# Patient Record
Sex: MALE | Race: BLACK OR AFRICAN AMERICAN | NOT HISPANIC OR LATINO | URBAN - METROPOLITAN AREA
[De-identification: names, ages, dates, MRNs, and addresses within clinical notes are randomized per-mention and may not be internally consistent; named-entity substitution may affect disease eponyms.]

---

## 2018-02-24 ENCOUNTER — OUTPATIENT (OUTPATIENT)
Dept: OUTPATIENT SERVICES | Facility: HOSPITAL | Age: 68
LOS: 1 days | Discharge: HOME | End: 2018-02-24

## 2018-02-24 DIAGNOSIS — M54.12 RADICULOPATHY, CERVICAL REGION: ICD-10-CM

## 2018-02-24 DIAGNOSIS — C61 MALIGNANT NEOPLASM OF PROSTATE: ICD-10-CM

## 2018-02-24 DIAGNOSIS — I10 ESSENTIAL (PRIMARY) HYPERTENSION: ICD-10-CM

## 2018-02-24 DIAGNOSIS — E66.9 OBESITY, UNSPECIFIED: ICD-10-CM

## 2018-04-07 ENCOUNTER — OUTPATIENT (OUTPATIENT)
Dept: OUTPATIENT SERVICES | Facility: HOSPITAL | Age: 68
LOS: 1 days | Discharge: HOME | End: 2018-04-07

## 2018-04-07 DIAGNOSIS — E66.9 OBESITY, UNSPECIFIED: ICD-10-CM

## 2018-04-07 DIAGNOSIS — E55.9 VITAMIN D DEFICIENCY, UNSPECIFIED: ICD-10-CM

## 2018-04-07 DIAGNOSIS — E78.5 HYPERLIPIDEMIA, UNSPECIFIED: ICD-10-CM

## 2018-04-07 DIAGNOSIS — I10 ESSENTIAL (PRIMARY) HYPERTENSION: ICD-10-CM

## 2018-04-07 PROBLEM — Z00.00 ENCOUNTER FOR PREVENTIVE HEALTH EXAMINATION: Status: ACTIVE | Noted: 2018-04-07

## 2018-05-11 ENCOUNTER — APPOINTMENT (OUTPATIENT)
Dept: UROLOGY | Facility: CLINIC | Age: 68
End: 2018-05-11
Payer: MEDICARE

## 2018-05-11 VITALS
HEIGHT: 70 IN | WEIGHT: 215 LBS | HEART RATE: 52 BPM | SYSTOLIC BLOOD PRESSURE: 134 MMHG | BODY MASS INDEX: 30.78 KG/M2 | DIASTOLIC BLOOD PRESSURE: 81 MMHG

## 2018-05-11 DIAGNOSIS — Z82.0 FAMILY HISTORY OF EPILEPSY AND OTHER DISEASES OF THE NERVOUS SYSTEM: ICD-10-CM

## 2018-05-11 DIAGNOSIS — Z82.49 FAMILY HISTORY OF ISCHEMIC HEART DISEASE AND OTHER DISEASES OF THE CIRCULATORY SYSTEM: ICD-10-CM

## 2018-05-11 DIAGNOSIS — Z78.9 OTHER SPECIFIED HEALTH STATUS: ICD-10-CM

## 2018-05-11 DIAGNOSIS — K59.09 OTHER CONSTIPATION: ICD-10-CM

## 2018-05-11 DIAGNOSIS — Z83.3 FAMILY HISTORY OF DIABETES MELLITUS: ICD-10-CM

## 2018-05-11 PROCEDURE — 99203 OFFICE O/P NEW LOW 30 MIN: CPT

## 2018-05-11 RX ORDER — BENZONATATE 200 MG/1
200 CAPSULE ORAL
Qty: 20 | Refills: 0 | Status: COMPLETED | COMMUNITY
Start: 2018-02-15 | End: 2018-05-11

## 2018-05-11 RX ORDER — RANITIDINE 150 MG/1
150 TABLET ORAL
Qty: 60 | Refills: 0 | Status: COMPLETED | COMMUNITY
Start: 2018-04-03 | End: 2018-05-11

## 2018-05-11 RX ORDER — PSYLLIUM SEED
PACKET (EA) ORAL
Refills: 0 | Status: ACTIVE | COMMUNITY

## 2018-05-11 RX ORDER — POLYETHYLENE GLYCOL-3350 AND ELECTROLYTES 236; 6.74; 5.86; 2.97; 22.74 G/274.31G; G/274.31G; G/274.31G; G/274.31G; G/274.31G
236 POWDER, FOR SOLUTION ORAL
Qty: 4000 | Refills: 0 | Status: COMPLETED | COMMUNITY
Start: 2018-04-03

## 2018-05-11 RX ORDER — DOCUSATE SODIUM 100 MG/1
100 CAPSULE, LIQUID FILLED ORAL
Qty: 90 | Refills: 0 | Status: COMPLETED | COMMUNITY
Start: 2018-01-03

## 2018-05-11 RX ORDER — LEVOFLOXACIN 500 MG/1
500 TABLET, FILM COATED ORAL
Qty: 5 | Refills: 0 | Status: COMPLETED | COMMUNITY
Start: 2017-12-18

## 2018-05-11 RX ORDER — CASTOR OIL
OIL (ML) ORAL
Qty: 300 | Refills: 0 | Status: COMPLETED | COMMUNITY
Start: 2018-01-03

## 2018-05-11 RX ORDER — NIACIN 1000 MG/1
TABLET, FILM COATED, EXTENDED RELEASE ORAL
Refills: 0 | Status: ACTIVE | COMMUNITY

## 2018-05-11 RX ORDER — OSELTAMIVIR PHOSPHATE 75 MG/1
75 CAPSULE ORAL
Qty: 10 | Refills: 0 | Status: COMPLETED | COMMUNITY
Start: 2018-02-15

## 2018-05-11 NOTE — REVIEW OF SYSTEMS
[Eyesight Problems] : eyesight problems [Constipation] : constipation [Incontinence] : incontinence [see HPI] : see HPI [Erectile Dysfunction] : erectile dysfunction [Negative] : Heme/Lymph

## 2018-05-11 NOTE — PHYSICAL EXAM
[General Appearance - Well Developed] : well developed [General Appearance - Well Nourished] : well nourished [Normal Appearance] : normal appearance [Well Groomed] : well groomed [General Appearance - In No Acute Distress] : no acute distress [Heart Rate And Rhythm] : Heart rate and rhythm were normal [Edema] : no peripheral edema [] : no respiratory distress [Respiration, Rhythm And Depth] : normal respiratory rhythm and effort [Exaggerated Use Of Accessory Muscles For Inspiration] : no accessory muscle use [Auscultation Breath Sounds / Voice Sounds] : lungs were clear to auscultation bilaterally [Abdomen Soft] : soft [Abdomen Tenderness] : non-tender [Abdomen Hernia] : no hernia was discovered [Costovertebral Angle Tenderness] : no ~M costovertebral angle tenderness [Penis Abnormality] : normal uncircumcised penis [Epididymis] : the epididymides were normal [Testes Tenderness] : no tenderness of the testes [Testes Mass (___cm)] : there were no testicular masses [Normal Station and Gait] : the gait and station were normal for the patient's age [No Focal Deficits] : no focal deficits [Oriented To Time, Place, And Person] : oriented to person, place, and time [Affect] : the affect was normal [Mood] : the mood was normal [Not Anxious] : not anxious [FreeTextEntry1] : DTR's & BC reflexes were intact

## 2018-05-11 NOTE — HISTORY OF PRESENT ILLNESS
[Urinary Incontinence] : urinary incontinence [Erectile Dysfunction] : Erectile Dysfunction [FreeTextEntry1] : Mr. Mcintosh is a very pleasant 59-year-old male who one year ago had a radical prostatectomy done open in [place]. Since his operation he said total incontinence. The time is bladder wholesome urine is when he is lying down and this soon as he stands up it justly straight out. He also has no erections. He’s been tried on PDE five inhibitors and it didn’t work. He has not been tried on self injection, and he is not try to Cunningham clamp. He sent here consideration for what we can do to help them both with respect to his incontinence and his impotence.\par \par He denies any comorbidities hypertension diabetes etc. has a long-term partner and if we can fix his erections he feels she would be very happy.\par

## 2018-05-11 NOTE — LETTER BODY
[Dear  ___] : Dear  [unfilled], [Consult Letter:] : I had the pleasure of evaluating your patient, [unfilled]. [Please see my note below.] : Please see my note below. [Consult Closing:] : Thank you very much for allowing me to participate in the care of this patient.  If you have any questions, please do not hesitate to contact me. [Sincerely,] : Sincerely, [FreeTextEntry2] : Juan Osborn DO\par [address] [place] [address]\par [place] [phone]

## 2018-05-11 NOTE — LETTER HEADER
[Radha Younger MD] : Radha Younger MD [Director of Urology] : Director of Urology [[address]] : [address] [[address]] : [address] [[place] [address]] : [place] [address] [Tel [phone]] : Tel [phone] [Fax [phone]] : Fax [phone]

## 2018-05-11 NOTE — ASSESSMENT
[FreeTextEntry1] : His exam shows him to be in fair shape and spirits. He has mild tethering of the dorsal surface of the Tunica but not enough to be considered Peyronie’s. The fossa is flat. Lying down and with strain he doesn’t leak. However as soon as he stands up and gives even the smallest cough the urine just pours out. LAURA shows a flat fossa.\par \par With respect to his incontinence will need urodynamics to see if he’s a candidate for a sling or if he needs a sphincter but I don’t think anything else will help him. In the interim if he wants to not walk around smelling from urine in a diaper he can try a Cunningham clamp and he will give him some information on that.\par \par With respect to his erections he can consider self injection or he can consider a dual sphincter penile prosthesis which could be done at the same time.\par \par What I will do is set him up for the urodynamics, in the interim either before or after especially given the degree of his incontinence he may not need the urodynamics, I’ll have him see our prosthetic specialist Dr. Castillo. He will also get a PSA if he does not have one recent as well as hormone levels. Finally I will need his Byars criteria classification are going to treat his erections and obviously he will need medical clearance if he’s going to surgery.\par

## 2018-05-12 ENCOUNTER — LABORATORY RESULT (OUTPATIENT)
Age: 68
End: 2018-05-12

## 2018-05-12 ENCOUNTER — OUTPATIENT (OUTPATIENT)
Dept: OUTPATIENT SERVICES | Facility: HOSPITAL | Age: 68
LOS: 1 days | Discharge: HOME | End: 2018-05-12

## 2018-05-12 DIAGNOSIS — E29.1 TESTICULAR HYPOFUNCTION: ICD-10-CM

## 2018-05-29 ENCOUNTER — APPOINTMENT (OUTPATIENT)
Dept: UROLOGY | Facility: CLINIC | Age: 68
End: 2018-05-29
Payer: MEDICARE

## 2018-05-29 VITALS
BODY MASS INDEX: 30.78 KG/M2 | HEIGHT: 70 IN | HEART RATE: 66 BPM | WEIGHT: 215 LBS | DIASTOLIC BLOOD PRESSURE: 91 MMHG | SYSTOLIC BLOOD PRESSURE: 142 MMHG

## 2018-05-29 PROCEDURE — 99215 OFFICE O/P EST HI 40 MIN: CPT

## 2018-06-18 ENCOUNTER — APPOINTMENT (OUTPATIENT)
Dept: UROLOGY | Facility: CLINIC | Age: 68
End: 2018-06-18
Payer: MEDICARE

## 2018-06-18 VITALS
DIASTOLIC BLOOD PRESSURE: 82 MMHG | HEIGHT: 70.5 IN | BODY MASS INDEX: 36.81 KG/M2 | WEIGHT: 260 LBS | HEART RATE: 70 BPM | SYSTOLIC BLOOD PRESSURE: 131 MMHG

## 2018-06-18 PROCEDURE — 99213 OFFICE O/P EST LOW 20 MIN: CPT | Mod: 25

## 2018-06-18 PROCEDURE — 52000 CYSTOURETHROSCOPY: CPT

## 2018-07-02 ENCOUNTER — APPOINTMENT (OUTPATIENT)
Dept: UROLOGY | Facility: CLINIC | Age: 68
End: 2018-07-02

## 2018-08-01 ENCOUNTER — OUTPATIENT (OUTPATIENT)
Dept: OUTPATIENT SERVICES | Facility: HOSPITAL | Age: 68
LOS: 1 days | Discharge: HOME | End: 2018-08-01

## 2018-08-01 VITALS
TEMPERATURE: 97 F | HEIGHT: 71 IN | SYSTOLIC BLOOD PRESSURE: 133 MMHG | DIASTOLIC BLOOD PRESSURE: 86 MMHG | HEART RATE: 65 BPM | OXYGEN SATURATION: 100 % | WEIGHT: 213.63 LBS

## 2018-08-01 DIAGNOSIS — Z98.890 OTHER SPECIFIED POSTPROCEDURAL STATES: Chronic | ICD-10-CM

## 2018-08-01 DIAGNOSIS — N32.0 BLADDER-NECK OBSTRUCTION: ICD-10-CM

## 2018-08-01 DIAGNOSIS — C80.1 MALIGNANT (PRIMARY) NEOPLASM, UNSPECIFIED: ICD-10-CM

## 2018-08-01 DIAGNOSIS — Z01.818 ENCOUNTER FOR OTHER PREPROCEDURAL EXAMINATION: ICD-10-CM

## 2018-08-01 DIAGNOSIS — R06.02 SHORTNESS OF BREATH: ICD-10-CM

## 2018-08-01 DIAGNOSIS — R07.9 CHEST PAIN, UNSPECIFIED: ICD-10-CM

## 2018-08-01 DIAGNOSIS — G47.30 SLEEP APNEA, UNSPECIFIED: ICD-10-CM

## 2018-08-01 LAB
ALBUMIN SERPL ELPH-MCNC: 4.1 G/DL — SIGNIFICANT CHANGE UP (ref 3.5–5.2)
ALP SERPL-CCNC: 56 U/L — SIGNIFICANT CHANGE UP (ref 30–115)
ALT FLD-CCNC: 12 U/L — SIGNIFICANT CHANGE UP (ref 0–41)
ANION GAP SERPL CALC-SCNC: 10 MMOL/L — SIGNIFICANT CHANGE UP (ref 7–14)
APPEARANCE UR: CLEAR — SIGNIFICANT CHANGE UP
APTT BLD: 35.1 SEC — SIGNIFICANT CHANGE UP (ref 27–39.2)
AST SERPL-CCNC: 18 U/L — SIGNIFICANT CHANGE UP (ref 0–41)
BILIRUB SERPL-MCNC: 0.3 MG/DL — SIGNIFICANT CHANGE UP (ref 0.2–1.2)
BILIRUB UR-MCNC: NEGATIVE — SIGNIFICANT CHANGE UP
BLD GP AB SCN SERPL QL: SIGNIFICANT CHANGE UP
BUN SERPL-MCNC: 13 MG/DL — SIGNIFICANT CHANGE UP (ref 10–20)
CALCIUM SERPL-MCNC: 9.2 MG/DL — SIGNIFICANT CHANGE UP (ref 8.5–10.1)
CHLORIDE SERPL-SCNC: 102 MMOL/L — SIGNIFICANT CHANGE UP (ref 98–110)
CO2 SERPL-SCNC: 27 MMOL/L — SIGNIFICANT CHANGE UP (ref 17–32)
COLOR SPEC: YELLOW — SIGNIFICANT CHANGE UP
CREAT SERPL-MCNC: 1 MG/DL — SIGNIFICANT CHANGE UP (ref 0.7–1.5)
DIFF PNL FLD: NEGATIVE — SIGNIFICANT CHANGE UP
GLUCOSE SERPL-MCNC: 75 MG/DL — SIGNIFICANT CHANGE UP (ref 70–99)
GLUCOSE UR QL: NEGATIVE MG/DL — SIGNIFICANT CHANGE UP
HCT VFR BLD CALC: 39.2 % — LOW (ref 42–52)
HGB BLD-MCNC: 13.4 G/DL — LOW (ref 14–18)
INR BLD: 1.15 RATIO — SIGNIFICANT CHANGE UP (ref 0.65–1.3)
KETONES UR-MCNC: NEGATIVE — SIGNIFICANT CHANGE UP
LEUKOCYTE ESTERASE UR-ACNC: NEGATIVE — SIGNIFICANT CHANGE UP
MCHC RBC-ENTMCNC: 31.8 PG — HIGH (ref 27–31)
MCHC RBC-ENTMCNC: 34.2 G/DL — SIGNIFICANT CHANGE UP (ref 32–37)
MCV RBC AUTO: 93.1 FL — SIGNIFICANT CHANGE UP (ref 80–94)
NITRITE UR-MCNC: NEGATIVE — SIGNIFICANT CHANGE UP
NRBC # BLD: 0 /100 WBCS — SIGNIFICANT CHANGE UP (ref 0–0)
PH UR: 7.5 — SIGNIFICANT CHANGE UP (ref 5–8)
PLATELET # BLD AUTO: 189 K/UL — SIGNIFICANT CHANGE UP (ref 130–400)
POTASSIUM SERPL-MCNC: 4.3 MMOL/L — SIGNIFICANT CHANGE UP (ref 3.5–5)
POTASSIUM SERPL-SCNC: 4.3 MMOL/L — SIGNIFICANT CHANGE UP (ref 3.5–5)
PROT SERPL-MCNC: 6.6 G/DL — SIGNIFICANT CHANGE UP (ref 6–8)
PROT UR-MCNC: NEGATIVE MG/DL — SIGNIFICANT CHANGE UP
PROTHROM AB SERPL-ACNC: 12.4 SEC — SIGNIFICANT CHANGE UP (ref 9.95–12.87)
RBC # BLD: 4.21 M/UL — LOW (ref 4.7–6.1)
RBC # FLD: 13.1 % — SIGNIFICANT CHANGE UP (ref 11.5–14.5)
SODIUM SERPL-SCNC: 139 MMOL/L — SIGNIFICANT CHANGE UP (ref 135–146)
SP GR SPEC: 1.02 — SIGNIFICANT CHANGE UP (ref 1.01–1.03)
TYPE + AB SCN PNL BLD: SIGNIFICANT CHANGE UP
UROBILINOGEN FLD QL: 0.2 MG/DL — SIGNIFICANT CHANGE UP (ref 0.2–0.2)
WBC # BLD: 4.6 K/UL — LOW (ref 4.8–10.8)
WBC # FLD AUTO: 4.6 K/UL — LOW (ref 4.8–10.8)

## 2018-08-01 NOTE — H&P PST ADULT - PMH
Migraines    Numbness  legs toes siatica  Obesity    Prostate cancer    Seasonal allergies    Sleep apnea  no c-pap insurance issues  SOB (shortness of breath)

## 2018-08-01 NOTE — H&P PST ADULT - HISTORY OF PRESENT ILLNESS
67 year old male having issues with leaking urine that is getting" worse and worse". here today for past testing for upcoming surgery to "fix that"  fos= 1 after 1 +sob + tightness in chest after climbing tests +palp  denies uri or uti  PT DENEIS ANY RASHES, ABRASION, OR OPEN WOUNDS OR CUTS  Get feeling of skin "crawling ,happens pretty often"

## 2018-08-02 PROBLEM — G47.30 SLEEP APNEA, UNSPECIFIED: Chronic | Status: ACTIVE | Noted: 2018-08-01

## 2018-08-02 PROBLEM — R20.0 ANESTHESIA OF SKIN: Chronic | Status: ACTIVE | Noted: 2018-08-01

## 2018-08-02 LAB
CULTURE RESULTS: NO GROWTH — SIGNIFICANT CHANGE UP
SPECIMEN SOURCE: SIGNIFICANT CHANGE UP

## 2018-08-15 PROBLEM — J30.2 OTHER SEASONAL ALLERGIC RHINITIS: Chronic | Status: ACTIVE | Noted: 2018-08-01

## 2018-08-15 PROBLEM — R06.02 SHORTNESS OF BREATH: Chronic | Status: ACTIVE | Noted: 2018-08-01

## 2018-08-15 PROBLEM — E66.9 OBESITY, UNSPECIFIED: Chronic | Status: ACTIVE | Noted: 2018-08-01

## 2018-08-15 PROBLEM — G43.909 MIGRAINE, UNSPECIFIED, NOT INTRACTABLE, WITHOUT STATUS MIGRAINOSUS: Chronic | Status: ACTIVE | Noted: 2018-08-01

## 2018-08-15 PROBLEM — C61 MALIGNANT NEOPLASM OF PROSTATE: Chronic | Status: ACTIVE | Noted: 2018-08-01

## 2018-08-24 ENCOUNTER — OUTPATIENT (OUTPATIENT)
Dept: OUTPATIENT SERVICES | Facility: HOSPITAL | Age: 68
LOS: 1 days | Discharge: HOME | End: 2018-08-24

## 2018-08-24 DIAGNOSIS — Z98.890 OTHER SPECIFIED POSTPROCEDURAL STATES: Chronic | ICD-10-CM

## 2018-08-27 ENCOUNTER — OUTPATIENT (OUTPATIENT)
Dept: OUTPATIENT SERVICES | Facility: HOSPITAL | Age: 68
LOS: 1 days | Discharge: HOME | End: 2018-08-27

## 2018-08-27 DIAGNOSIS — I20.9 ANGINA PECTORIS, UNSPECIFIED: ICD-10-CM

## 2018-08-27 DIAGNOSIS — Z98.890 OTHER SPECIFIED POSTPROCEDURAL STATES: Chronic | ICD-10-CM

## 2018-08-29 ENCOUNTER — OUTPATIENT (OUTPATIENT)
Dept: OUTPATIENT SERVICES | Facility: HOSPITAL | Age: 68
LOS: 1 days | Discharge: HOME | End: 2018-08-29

## 2018-08-29 ENCOUNTER — APPOINTMENT (OUTPATIENT)
Dept: UROLOGY | Facility: HOSPITAL | Age: 68
End: 2018-08-29
Payer: MEDICARE

## 2018-08-29 ENCOUNTER — INPATIENT (INPATIENT)
Facility: HOSPITAL | Age: 68
LOS: 1 days | Discharge: HOME | End: 2018-08-31
Attending: UROLOGY | Admitting: UROLOGY

## 2018-08-29 VITALS
DIASTOLIC BLOOD PRESSURE: 79 MMHG | SYSTOLIC BLOOD PRESSURE: 145 MMHG | OXYGEN SATURATION: 98 % | HEART RATE: 79 BPM | RESPIRATION RATE: 18 BRPM

## 2018-08-29 VITALS
DIASTOLIC BLOOD PRESSURE: 80 MMHG | RESPIRATION RATE: 18 BRPM | HEART RATE: 76 BPM | OXYGEN SATURATION: 96 % | SYSTOLIC BLOOD PRESSURE: 126 MMHG

## 2018-08-29 VITALS
WEIGHT: 214.07 LBS | TEMPERATURE: 99 F | HEIGHT: 70 IN | DIASTOLIC BLOOD PRESSURE: 84 MMHG | HEART RATE: 57 BPM | RESPIRATION RATE: 18 BRPM | SYSTOLIC BLOOD PRESSURE: 129 MMHG

## 2018-08-29 DIAGNOSIS — Z98.890 OTHER SPECIFIED POSTPROCEDURAL STATES: Chronic | ICD-10-CM

## 2018-08-29 DIAGNOSIS — Z90.79 ACQUIRED ABSENCE OF OTHER GENITAL ORGAN(S): Chronic | ICD-10-CM

## 2018-08-29 PROCEDURE — 52640 RELIEVE BLADDER CONTRACTURE: CPT

## 2018-08-29 RX ORDER — METOCLOPRAMIDE HCL 10 MG
10 TABLET ORAL ONCE
Qty: 0 | Refills: 0 | Status: DISCONTINUED | OUTPATIENT
Start: 2018-08-29 | End: 2018-08-29

## 2018-08-29 RX ORDER — HYDROMORPHONE HYDROCHLORIDE 2 MG/ML
0.5 INJECTION INTRAMUSCULAR; INTRAVENOUS; SUBCUTANEOUS
Qty: 0 | Refills: 0 | Status: DISCONTINUED | OUTPATIENT
Start: 2018-08-29 | End: 2018-08-29

## 2018-08-29 RX ORDER — FUROSEMIDE 40 MG
20 TABLET ORAL ONCE
Qty: 0 | Refills: 0 | Status: COMPLETED | OUTPATIENT
Start: 2018-08-29 | End: 2018-08-29

## 2018-08-29 RX ORDER — SODIUM CHLORIDE 9 MG/ML
1000 INJECTION, SOLUTION INTRAVENOUS ONCE
Qty: 0 | Refills: 0 | Status: COMPLETED | OUTPATIENT
Start: 2018-08-29 | End: 2018-08-29

## 2018-08-29 RX ORDER — MITOMYCIN 5 MG/10ML
10 INJECTION, POWDER, LYOPHILIZED, FOR SOLUTION INTRAVENOUS ONCE
Qty: 0 | Refills: 0 | Status: COMPLETED | OUTPATIENT
Start: 2018-08-29 | End: 2018-08-29

## 2018-08-29 RX ORDER — KETOROLAC TROMETHAMINE 30 MG/ML
30 SYRINGE (ML) INJECTION ONCE
Qty: 0 | Refills: 0 | Status: DISCONTINUED | OUTPATIENT
Start: 2018-08-29 | End: 2018-08-29

## 2018-08-29 RX ORDER — SODIUM CHLORIDE 9 MG/ML
1000 INJECTION, SOLUTION INTRAVENOUS
Qty: 0 | Refills: 0 | Status: DISCONTINUED | OUTPATIENT
Start: 2018-08-29 | End: 2018-08-29

## 2018-08-29 RX ADMIN — SODIUM CHLORIDE 100 MILLILITER(S): 9 INJECTION, SOLUTION INTRAVENOUS at 09:00

## 2018-08-29 RX ADMIN — MITOMYCIN 10 MILLIGRAM(S): 5 INJECTION, POWDER, LYOPHILIZED, FOR SOLUTION INTRAVENOUS at 08:58

## 2018-08-29 RX ADMIN — Medication 104 MILLIGRAM(S): at 10:43

## 2018-08-29 NOTE — H&P ADULT - ASSESSMENT
67y old Male with bladder neck contracture s/p transurethral incision and mitomycin injection POD#0 with gross hematuria via Nowak catheter.    PLAN:  1.	NPO after midnight  2.	Monitor H/H. Transfuse prn.  3.	Irrigate Nowak with sterile H2O q2-3 hrs. Will change catheter to 3-way and start CBI tonight.  4.	Home medications  5.	DVT ppx  6.	Discussed with Dr. Thompson 67y old Male with bladder neck contracture s/p transurethral incision and mitomycin injection POD#0 with gross hematuria via Nowak catheter.    PLAN:  1.	NPO after midnight  2.	F/u labs  3.	Monitor H/H. Transfuse prn.  4.	Irrigate Nowak with sterile H2O q2-3 hrs. Will change catheter to 3-way and start CBI tonight.  5.	Home medications  6.	DVT ppx  7.	Discussed with Dr. Thompson

## 2018-08-29 NOTE — BRIEF OPERATIVE NOTE - PROCEDURE
<<-----Click on this checkbox to enter Procedure Transurethral incision for bladder neck contracture  08/29/2018    Active  MAUDE

## 2018-08-29 NOTE — ED PROVIDER NOTE - NS ED ROS FT
Constitutional:  no fevers, no chills, no malaise  Eyes:  No visual changes  ENMT: No neck pain or stiffness, no nasal congestion, no ear pain, no throat pain  Cardiac:  No chest pain, + hx of HTN  Respiratory:  No cough or sob  GI:  No nausea, vomiting, diarrhea or abdominal pain.  :  As per HPI  MS:  No back pain, no joint pain.  Neuro:  No headache, no dizziness, no change in mental status  Skin:  No skin rash

## 2018-08-29 NOTE — ED PROVIDER NOTE - PHYSICAL EXAMINATION
CONSTITUTIONAL: Well-developed; well-nourished; in no acute distress, nontoxic appearing  SKIN: skin exam is warm and dry,  HEAD: Normocephalic; atraumatic.  EYES: PERRL, 3 mm bilateral, no nystagmus, EOM intact; conjunctiva and sclera clear.  ENT: MMM, no nasal congestion  NECK: Supple; non tender.+ full passive ROM in all directions. No JVD  CARD: S1, S2 normal, no murmur  RESP: No wheezes, rales or rhonchi. Good air movement bilaterally  ABD: soft; non-distended; + mild suprapubic abd ttp, No Rebound, No guarding  : + solomon catheter in place with a 500 cc grossly bloody outpt with clot from the Solomon os  EXT: Normal ROM. No cyanosis or edema. Dp Pulses intact.   NEURO: awake, alert, following commands, oriented, grossly unremarkable. No Focal deficits. GCS 15.   PSYCH: Cooperative, appropriate.

## 2018-08-29 NOTE — CHART NOTE - NSCHARTNOTEFT_GEN_A_CORE
VERONICA    Called to evaluate pt for Nowak not draining well.    Pt doing well in NAD  no suprapubic pain of discomfort.  Onwak draining blood tinged urine  catheter irrigated with 5oocc of sterile water  Return now clear 2 small clots removed.  will observe for another hr if remain clear will d/c home if catheter clots off will admit for observation  d/w Dr. pace

## 2018-08-29 NOTE — H&P ADULT - PMH
Incontinence of urine    Migraines    Numbness  legs toes siatica  Obesity    Prostate cancer    Seasonal allergies    Sleep apnea  no c-pap insurance issues  SOB (shortness of breath)

## 2018-08-29 NOTE — BRIEF OPERATIVE NOTE - OPERATION/FINDINGS
bladder neck contracture  incised to 24 Setswana  injection of 10 mg of Mitomycin C into contracture

## 2018-08-29 NOTE — ED PROVIDER NOTE - MEDICAL DECISION MAKING DETAILS
Pt s/p surgery today for uretheral strictures. Had hematuria. that persisted in ED. Is being admitted to urology.

## 2018-08-29 NOTE — ED PROVIDER NOTE - OBJECTIVE STATEMENT
67 yr M, hx of HTN, bladder neck stricture,  s/p reection today by Dr. Thompson, with complaints of blockage of the Nowak from a blood clot. pt reports being d/juan from PACU about 4 hours ago and reports decreased urinary output that is grossly bloody. Now have lower abd discomfort. No f/c, no nausea, vomiting, no lightheadedness, LOC, no use of AC/AP.

## 2018-08-29 NOTE — H&P ADULT - PSH
H/O eye surgery  right eye cornea  H/O radical prostatectomy    History of prostate surgery  removed 2/14/17

## 2018-08-29 NOTE — CHART NOTE - NSCHARTNOTEFT_GEN_A_CORE
VERONICA    Asked to see pt for post-op hematuria.  Pt doing well, pt was irrigated by Dr. Thompson.  Nowak draining well blood tinged urine.  Increase po fluids  Observe for another hour.  if urine remains blood tinged may be d/c'd if bloody  recall for evaluation.

## 2018-08-29 NOTE — H&P ADULT - NSHPPHYSICALEXAM_GEN_ALL_CORE
PHYSICAL EXAM:   VS: T(F): 97.2, Max: 98.9 (08-29 @ 06:17)  HR: 70 (56 - 79)  BP: 106/62 (106/62 - 171/103)  RR: 18  SpO2: 96% (92% - 100%)    General: well-appearing, no acute distress  HEENT: no posterior pharyngeal erythema, PERRL, no cervical lymphadenopathy, no injected conjunctiva, mucous membranes moist  HEART: RRR, S1, S2, cap refill <2 seconds  LUNG: CTAB, no wheezing, ronchi, or crackles  ABDOMEN: soft, NT, nondistended, no palpable masses, no suprapubic tenderness  : uncirc Male, foreskin easily retracts, some blood at meatus around Nowak catheter but no d/c, 20F Nowak in place draining bloody urine  NEURO: A&O, speech clear  MUSCULOSKELETAL: moves all extremities x4 with 5/5 strength throughout, bilateral LE warm/well perfused, no edema    Irrigated Nowak with 2L sterile H2O. 50cc clot removed. Irrigated until light red. Pt tolerated well.

## 2018-08-29 NOTE — ED PROVIDER NOTE - PROGRESS NOTE DETAILS
Urology PA consulted. Will come to evaluate pt. Urology TEREZA Cruz evaluated pt and flushed solomon and reports that urine out still grossly bloody. She will reassess pt in about 1 hour to determine dispo. Nancy reevaluated pt and stated that gross hematuria still present. Pt to be admitted to Dr. Thompson

## 2018-08-29 NOTE — H&P ADULT - HISTORY OF PRESENT ILLNESS
67y old Male with bladder neck contracture s/p transurethral incision and mitomycin injection POD#0. Pt states in PACU today, his urine via Nowak was grossly bloody, requiring irrigation on three separate occasions. Pt was sent home in stable condition. He presented to ER tonight c/o worsening/persistent gross hematuria with associated suprapubic pain. Denies fever, chills, N/V, weakness, lethargy, SOB, chest pain.

## 2018-08-29 NOTE — H&P ADULT - ATTENDING COMMENTS
I have seen and evaluated the patient   there was no evidence of active bleeding on cbi  the CBI was clamped and it appeared still tinged but without active bleeding  Hgb decreased to 34    - will continue to monitor patient  - CBC in AM

## 2018-08-29 NOTE — ED ADULT TRIAGE NOTE - CHIEF COMPLAINT QUOTE
pt had sx for bladder neck stricture today, had solomon catheter placed, now having gross hematuria.

## 2018-08-30 LAB
ALBUMIN SERPL ELPH-MCNC: 4.1 G/DL — SIGNIFICANT CHANGE UP (ref 3.5–5.2)
ALP SERPL-CCNC: 46 U/L — SIGNIFICANT CHANGE UP (ref 30–115)
ALT FLD-CCNC: 11 U/L — SIGNIFICANT CHANGE UP (ref 0–41)
ANION GAP SERPL CALC-SCNC: 11 MMOL/L — SIGNIFICANT CHANGE UP (ref 7–14)
ANION GAP SERPL CALC-SCNC: 12 MMOL/L — SIGNIFICANT CHANGE UP (ref 7–14)
AST SERPL-CCNC: 16 U/L — SIGNIFICANT CHANGE UP (ref 0–41)
BASOPHILS # BLD AUTO: 0.01 K/UL — SIGNIFICANT CHANGE UP (ref 0–0.2)
BASOPHILS # BLD AUTO: 0.02 K/UL — SIGNIFICANT CHANGE UP (ref 0–0.2)
BASOPHILS NFR BLD AUTO: 0.1 % — SIGNIFICANT CHANGE UP (ref 0–1)
BASOPHILS NFR BLD AUTO: 0.2 % — SIGNIFICANT CHANGE UP (ref 0–1)
BILIRUB SERPL-MCNC: 0.4 MG/DL — SIGNIFICANT CHANGE UP (ref 0.2–1.2)
BLD GP AB SCN SERPL QL: SIGNIFICANT CHANGE UP
BUN SERPL-MCNC: 17 MG/DL — SIGNIFICANT CHANGE UP (ref 10–20)
BUN SERPL-MCNC: 21 MG/DL — HIGH (ref 10–20)
CALCIUM SERPL-MCNC: 8.7 MG/DL — SIGNIFICANT CHANGE UP (ref 8.5–10.1)
CALCIUM SERPL-MCNC: 9.3 MG/DL — SIGNIFICANT CHANGE UP (ref 8.5–10.1)
CHLORIDE SERPL-SCNC: 102 MMOL/L — SIGNIFICANT CHANGE UP (ref 98–110)
CHLORIDE SERPL-SCNC: 97 MMOL/L — LOW (ref 98–110)
CO2 SERPL-SCNC: 26 MMOL/L — SIGNIFICANT CHANGE UP (ref 17–32)
CO2 SERPL-SCNC: 27 MMOL/L — SIGNIFICANT CHANGE UP (ref 17–32)
CREAT SERPL-MCNC: 1.1 MG/DL — SIGNIFICANT CHANGE UP (ref 0.7–1.5)
CREAT SERPL-MCNC: 1.3 MG/DL — SIGNIFICANT CHANGE UP (ref 0.7–1.5)
EOSINOPHIL # BLD AUTO: 0.01 K/UL — SIGNIFICANT CHANGE UP (ref 0–0.7)
EOSINOPHIL # BLD AUTO: 0.07 K/UL — SIGNIFICANT CHANGE UP (ref 0–0.7)
EOSINOPHIL NFR BLD AUTO: 0.1 % — SIGNIFICANT CHANGE UP (ref 0–8)
EOSINOPHIL NFR BLD AUTO: 0.8 % — SIGNIFICANT CHANGE UP (ref 0–8)
GLUCOSE SERPL-MCNC: 112 MG/DL — HIGH (ref 70–99)
GLUCOSE SERPL-MCNC: 82 MG/DL — SIGNIFICANT CHANGE UP (ref 70–99)
HCT VFR BLD CALC: 34.7 % — LOW (ref 42–52)
HCT VFR BLD CALC: 38.1 % — LOW (ref 42–52)
HGB BLD-MCNC: 12.1 G/DL — LOW (ref 14–18)
HGB BLD-MCNC: 13.2 G/DL — LOW (ref 14–18)
IMM GRANULOCYTES NFR BLD AUTO: 0.2 % — SIGNIFICANT CHANGE UP (ref 0.1–0.3)
IMM GRANULOCYTES NFR BLD AUTO: 0.4 % — HIGH (ref 0.1–0.3)
LYMPHOCYTES # BLD AUTO: 1.08 K/UL — LOW (ref 1.2–3.4)
LYMPHOCYTES # BLD AUTO: 1.56 K/UL — SIGNIFICANT CHANGE UP (ref 1.2–3.4)
LYMPHOCYTES # BLD AUTO: 18.6 % — LOW (ref 20.5–51.1)
LYMPHOCYTES # BLD AUTO: 9.7 % — LOW (ref 20.5–51.1)
MCHC RBC-ENTMCNC: 31.9 PG — HIGH (ref 27–31)
MCHC RBC-ENTMCNC: 32 PG — HIGH (ref 27–31)
MCHC RBC-ENTMCNC: 34.6 G/DL — SIGNIFICANT CHANGE UP (ref 32–37)
MCHC RBC-ENTMCNC: 34.9 G/DL — SIGNIFICANT CHANGE UP (ref 32–37)
MCV RBC AUTO: 91.8 FL — SIGNIFICANT CHANGE UP (ref 80–94)
MCV RBC AUTO: 92 FL — SIGNIFICANT CHANGE UP (ref 80–94)
MONOCYTES # BLD AUTO: 0.72 K/UL — HIGH (ref 0.1–0.6)
MONOCYTES # BLD AUTO: 0.77 K/UL — HIGH (ref 0.1–0.6)
MONOCYTES NFR BLD AUTO: 6.4 % — SIGNIFICANT CHANGE UP (ref 1.7–9.3)
MONOCYTES NFR BLD AUTO: 9.2 % — SIGNIFICANT CHANGE UP (ref 1.7–9.3)
NEUTROPHILS # BLD AUTO: 5.95 K/UL — SIGNIFICANT CHANGE UP (ref 1.4–6.5)
NEUTROPHILS # BLD AUTO: 9.32 K/UL — HIGH (ref 1.4–6.5)
NEUTROPHILS NFR BLD AUTO: 71 % — SIGNIFICANT CHANGE UP (ref 42.2–75.2)
NEUTROPHILS NFR BLD AUTO: 83.3 % — HIGH (ref 42.2–75.2)
NRBC # BLD: 0 /100 WBCS — SIGNIFICANT CHANGE UP (ref 0–0)
NRBC # BLD: 0 /100 WBCS — SIGNIFICANT CHANGE UP (ref 0–0)
PLATELET # BLD AUTO: 166 K/UL — SIGNIFICANT CHANGE UP (ref 130–400)
PLATELET # BLD AUTO: 188 K/UL — SIGNIFICANT CHANGE UP (ref 130–400)
POTASSIUM SERPL-MCNC: 3.8 MMOL/L — SIGNIFICANT CHANGE UP (ref 3.5–5)
POTASSIUM SERPL-MCNC: 4.5 MMOL/L — SIGNIFICANT CHANGE UP (ref 3.5–5)
POTASSIUM SERPL-SCNC: 3.8 MMOL/L — SIGNIFICANT CHANGE UP (ref 3.5–5)
POTASSIUM SERPL-SCNC: 4.5 MMOL/L — SIGNIFICANT CHANGE UP (ref 3.5–5)
PROT SERPL-MCNC: 6.5 G/DL — SIGNIFICANT CHANGE UP (ref 6–8)
RBC # BLD: 3.78 M/UL — LOW (ref 4.7–6.1)
RBC # BLD: 4.14 M/UL — LOW (ref 4.7–6.1)
RBC # FLD: 12.9 % — SIGNIFICANT CHANGE UP (ref 11.5–14.5)
RBC # FLD: 13 % — SIGNIFICANT CHANGE UP (ref 11.5–14.5)
SODIUM SERPL-SCNC: 136 MMOL/L — SIGNIFICANT CHANGE UP (ref 135–146)
SODIUM SERPL-SCNC: 139 MMOL/L — SIGNIFICANT CHANGE UP (ref 135–146)
TYPE + AB SCN PNL BLD: SIGNIFICANT CHANGE UP
WBC # BLD: 11.19 K/UL — HIGH (ref 4.8–10.8)
WBC # BLD: 8.39 K/UL — SIGNIFICANT CHANGE UP (ref 4.8–10.8)
WBC # FLD AUTO: 11.19 K/UL — HIGH (ref 4.8–10.8)
WBC # FLD AUTO: 8.39 K/UL — SIGNIFICANT CHANGE UP (ref 4.8–10.8)

## 2018-08-30 RX ORDER — CHLORHEXIDINE GLUCONATE 213 G/1000ML
1 SOLUTION TOPICAL
Qty: 0 | Refills: 0 | Status: DISCONTINUED | OUTPATIENT
Start: 2018-08-30 | End: 2018-08-31

## 2018-08-30 RX ORDER — SODIUM CHLORIDE 9 MG/ML
1000 INJECTION INTRAMUSCULAR; INTRAVENOUS; SUBCUTANEOUS
Qty: 0 | Refills: 0 | Status: DISCONTINUED | OUTPATIENT
Start: 2018-08-30 | End: 2018-08-31

## 2018-08-30 RX ORDER — SODIUM CHLORIDE 9 MG/ML
3 INJECTION INTRAMUSCULAR; INTRAVENOUS; SUBCUTANEOUS EVERY 8 HOURS
Qty: 0 | Refills: 0 | Status: DISCONTINUED | OUTPATIENT
Start: 2018-08-30 | End: 2018-08-31

## 2018-08-30 RX ORDER — ACETAMINOPHEN 500 MG
650 TABLET ORAL EVERY 6 HOURS
Qty: 0 | Refills: 0 | Status: DISCONTINUED | OUTPATIENT
Start: 2018-08-30 | End: 2018-08-31

## 2018-08-30 RX ORDER — ONDANSETRON 8 MG/1
4 TABLET, FILM COATED ORAL EVERY 6 HOURS
Qty: 0 | Refills: 0 | Status: DISCONTINUED | OUTPATIENT
Start: 2018-08-30 | End: 2018-08-31

## 2018-08-30 RX ORDER — LOSARTAN POTASSIUM 100 MG/1
25 TABLET, FILM COATED ORAL DAILY
Qty: 0 | Refills: 0 | Status: DISCONTINUED | OUTPATIENT
Start: 2018-08-30 | End: 2018-08-31

## 2018-08-30 RX ADMIN — SODIUM CHLORIDE 3 MILLILITER(S): 9 INJECTION INTRAMUSCULAR; INTRAVENOUS; SUBCUTANEOUS at 10:41

## 2018-08-30 RX ADMIN — SODIUM CHLORIDE 75 MILLILITER(S): 9 INJECTION INTRAMUSCULAR; INTRAVENOUS; SUBCUTANEOUS at 05:43

## 2018-08-30 RX ADMIN — LOSARTAN POTASSIUM 25 MILLIGRAM(S): 100 TABLET, FILM COATED ORAL at 06:54

## 2018-08-30 RX ADMIN — SODIUM CHLORIDE 3 MILLILITER(S): 9 INJECTION INTRAMUSCULAR; INTRAVENOUS; SUBCUTANEOUS at 05:45

## 2018-08-30 RX ADMIN — SODIUM CHLORIDE 1000 MILLILITER(S): 9 INJECTION, SOLUTION INTRAVENOUS at 00:04

## 2018-08-30 RX ADMIN — SODIUM CHLORIDE 3 MILLILITER(S): 9 INJECTION INTRAMUSCULAR; INTRAVENOUS; SUBCUTANEOUS at 21:20

## 2018-08-30 NOTE — PROGRESS NOTE ADULT - SUBJECTIVE AND OBJECTIVE BOX
Progress Note    Subjective  66 y/o Male with hx of bladder neck contracture s/p TUIBN.  Pt returned to the ED last pm for clot retention. He was started on CBI with  traction. CBI at a slow rate urine is clear to slightly tinged.  Pt with no complaints.      Vital signs  T(C): , Max: 36.9 (08-29-18 @ 15:00)  HR: 69 (08-30-18 @ 04:00)  BP: 137/75 (08-30-18 @ 04:00)  SpO2: 96% (08-29-18 @ 23:22)    Gen in NAD  Abd Soft non tender, bladder is not palpable   CBI slow drip clear to slightly tinged output    Labs                        13.2   11.19 )-----------( 188      ( 29 Aug 2018 23:18 )             38.1     29 Aug 2018 23:18    136    |  97     |  21     ----------------------------<  112    4.5     |  27     |  1.3      Ca    9.3        29 Aug 2018 23:18

## 2018-08-30 NOTE — PROGRESS NOTE ADULT - ATTENDING COMMENTS
Pt seen and examined at bedside  CBI was completely clear running slowly  I stopped the CBI and irrigated the bladder -  there were no clots and the color of the NS did not even become pink  we will stop the CBI altogether.  will follow up in a few hours. if remains clear, will DC solomon catheter and send home

## 2018-08-30 NOTE — PROGRESS NOTE ADULT - ASSESSMENT
66 y/o Male with hx of bladder neck contracture s/p TUIBN.  Pt returned to the ED last pm for clot retention. He was started on CBI with  traction. CBI at a slow rate urine is clear to slightly tinged.  Pt with no complaints.      A) post-op clot retention requiring CBI  Stable POD # 1    P) continue CBI  regular diet  will reassess later today  will d/w with Dr. Castillo

## 2018-08-30 NOTE — ED ADULT NURSE NOTE - NSIMPLEMENTINTERV_GEN_ALL_ED
Implemented All Universal Safety Interventions:  [place] to call system. Call bell, personal items and telephone within reach. Instruct patient to call for assistance. Room bathroom lighting operational. Non-slip footwear when patient is off stretcher. Physically safe environment: no spills, clutter or unnecessary equipment. Stretcher in lowest position, wheels locked, appropriate side rails in place.

## 2018-08-30 NOTE — CHART NOTE - NSCHARTNOTEFT_GEN_A_CORE
As per Dr. Thompson, removed 20F Nowak and replaced with 22F 3-way catheter under sterile technique. 30cc inflated into balloon. Irrigated all clots out until light red and started on fast drip CBI. Nowak secured to R leg under traction. Pt tolerated well. Will inform RN to monitor output and titrate prn.

## 2018-08-30 NOTE — ED ADULT NURSE NOTE - OBJECTIVE STATEMENT
Pt c/o bladder neck stricture s/p resection today by Dr. Thompson with complaints of blockage of the solomon from blood clot and increasing suprapubic pain. Pt hematuria grossly bloody.

## 2018-08-31 ENCOUNTER — TRANSCRIPTION ENCOUNTER (OUTPATIENT)
Age: 68
End: 2018-08-31

## 2018-08-31 VITALS
HEART RATE: 71 BPM | RESPIRATION RATE: 18 BRPM | TEMPERATURE: 97 F | DIASTOLIC BLOOD PRESSURE: 72 MMHG | SYSTOLIC BLOOD PRESSURE: 134 MMHG

## 2018-08-31 PROBLEM — R32 UNSPECIFIED URINARY INCONTINENCE: Chronic | Status: ACTIVE | Noted: 2018-08-29

## 2018-08-31 RX ADMIN — SODIUM CHLORIDE 3 MILLILITER(S): 9 INJECTION INTRAMUSCULAR; INTRAVENOUS; SUBCUTANEOUS at 05:48

## 2018-08-31 NOTE — DISCHARGE NOTE ADULT - MEDICATION SUMMARY - MEDICATIONS TO TAKE
I will START or STAY ON the medications listed below when I get home from the hospital:    vitiam b6  -- orally once a day  -- Indication: For Home med    prunelay ciruelax  -- orally once a day  -- Indication: For Home med    stool softener  -- orally once a day  -- Indication: For Home med    losartan 25 mg oral tablet  -- 1 tab(s) by mouth once a day  -- Indication: For for BP    Probiotic Formula oral capsule  -- 1 cap(s) by mouth once a day  -- Indication: For Home med

## 2018-08-31 NOTE — DISCHARGE NOTE ADULT - NS AS ACTIVITY OBS
No Heavy lifting/straining/Showering allowed/Walking-Indoors allowed/Walking-Outdoors allowed/Stairs allowed

## 2018-08-31 NOTE — DISCHARGE NOTE ADULT - CARE PROVIDER_API CALL
Onesimo Thompson), Urology  [address] [place] [address]  [address] [place] [address]  Phone: [phone]  Fax: [phone]

## 2018-08-31 NOTE — PROGRESS NOTE ADULT - SUBJECTIVE AND OBJECTIVE BOX
Progress Note    Subjective  66 y/o Male with hx of bladder neck contracture s/p TUIBN.  Pt with no complaints. Urine is clear      Vital signs  T(C): , Max: 36.5 (08-31-18 @ 00:00)  HR: 55 (08-31-18 @ 07:36)  BP: 115/76 (08-31-18 @ 07:36)  SpO2: --    Gen in NAD  Abd Soft non tender   Nowak draining well clear urine    Labs                        12.1   8.39  )-----------( 166      ( 30 Aug 2018 11:18 )             34.7     30 Aug 2018 11:18    139    |  102    |  17     ----------------------------<  82     3.8     |  26     |  1.1      Ca    8.7        30 Aug 2018 11:18

## 2018-08-31 NOTE — DISCHARGE NOTE ADULT - HOSPITAL COURSE
Pt underwent a TUIBN of a bladder neck stricture. The pt developed hematuria and presented to the  ED in Clot retention. a 3 way Nowak was placed the pt was irrigated until clear. CBI was started for 24 hrs.  The pt urine cleared and the CBI was stopped. The pt's urine remained clear off CBI and he  is being d/c'd home with Nowak to leg bag. He will f/u next Thursday for catheter removal.

## 2018-08-31 NOTE — DISCHARGE NOTE ADULT - CARE PLAN
Principal Discharge DX:	Gross hematuria  Goal:	to have the bleeding stop  Assessment and plan of treatment:	urine now clear

## 2018-08-31 NOTE — PROGRESS NOTE ADULT - ASSESSMENT
68 y/o Male with hx of bladder neck contracture s/p TUIBN.  Pt with no complaints. Urine is clear    A) resolved hematuria    P) d/c home today with Nowak to leg bag  f/u with Dr. Thompson next Thursday.

## 2018-09-03 DIAGNOSIS — G47.30 SLEEP APNEA, UNSPECIFIED: ICD-10-CM

## 2018-09-03 DIAGNOSIS — G43.909 MIGRAINE, UNSPECIFIED, NOT INTRACTABLE, WITHOUT STATUS MIGRAINOSUS: ICD-10-CM

## 2018-09-03 DIAGNOSIS — Z90.79 ACQUIRED ABSENCE OF OTHER GENITAL ORGAN(S): ICD-10-CM

## 2018-09-03 DIAGNOSIS — E66.9 OBESITY, UNSPECIFIED: ICD-10-CM

## 2018-09-03 DIAGNOSIS — R06.02 SHORTNESS OF BREATH: ICD-10-CM

## 2018-09-03 DIAGNOSIS — Z85.46 PERSONAL HISTORY OF MALIGNANT NEOPLASM OF PROSTATE: ICD-10-CM

## 2018-09-03 DIAGNOSIS — N32.0 BLADDER-NECK OBSTRUCTION: ICD-10-CM

## 2018-09-04 ENCOUNTER — APPOINTMENT (OUTPATIENT)
Dept: UROLOGY | Facility: CLINIC | Age: 68
End: 2018-09-04
Payer: MEDICARE

## 2018-09-04 VITALS
BODY MASS INDEX: 30.92 KG/M2 | DIASTOLIC BLOOD PRESSURE: 86 MMHG | HEIGHT: 70 IN | HEART RATE: 74 BPM | WEIGHT: 216 LBS | SYSTOLIC BLOOD PRESSURE: 130 MMHG

## 2018-09-04 DIAGNOSIS — N39.498 OTHER SPECIFIED URINARY INCONTINENCE: ICD-10-CM

## 2018-09-04 PROCEDURE — 99213 OFFICE O/P EST LOW 20 MIN: CPT | Mod: 24

## 2018-09-04 PROCEDURE — 51700 IRRIGATION OF BLADDER: CPT | Mod: 58

## 2018-09-04 RX ORDER — LOSARTAN POTASSIUM 25 MG/1
25 TABLET, FILM COATED ORAL
Qty: 30 | Refills: 0 | Status: ACTIVE | COMMUNITY
Start: 2018-08-13

## 2018-09-04 NOTE — HISTORY OF PRESENT ILLNESS
[FreeTextEntry1] : Mr. Mcintosh is a very pleasant 59-year-old male who two years ago had a radical prostatectomy done open in [place] February 2017. Since his operation he said total incontinence. He also has no erections. He’s been tried on PDE five inhibitors and it didn’t work. He has not been tried on self injection, and he has not wanted to try Cunningham clamp. He sent here consideration for what we can do to help them both with respect to his incontinence and his impotence. Changes pads about 4-6 times per day. \par \par He in particular wants to fix his incontinence first as he states that he cannot have good sex until he is dry.\par \par cysto in office showed bladder neck contracture which Dr Thompson opened in the OR. complicated by gross hematuria requiring admission and CBI.  today presents with compaint of leakage solomon and abdominal discomfort.  his urine in the catheter was clear. i tried to irrigate but was only able to inject water and not remove.  The catheter appeared clogged and I decided to remove it for TOV.\par  \par As far as erectile dysfunction, patient wants treatment for this as well but states that wants to make sure that he will not leak urine while having sex so wants to treat urinary incontinence first

## 2018-09-04 NOTE — ASSESSMENT
[FreeTextEntry1] : Mr. Mcintosh is a very pleasant 59-year-old male who two years ago had a radical prostatectomy done open in [place] February 2017. Since his operation he said total incontinence. He also has no erections. He’s been tried on PDE five inhibitors and it didn’t work. He has not been tried on self injection, and he has not wanted to try Cunningham clamp. He sent here consideration for what we can do to help them both with respect to his incontinence and his impotence. Changes pads about 4-6 times per day. \par \par He in particular wants to fix his incontinence first as he states that he cannot have good sex until he is dry.\par \par cysto in office showed bladder neck contracture which Dr Thompson opened in the OR. complicated by gross hematuria requiring admission and CBI.  today presents with compaint of leakage solomon and abdominal discomfort.  his urine in the catheter was clear. i tried to irrigate but was only able to inject water and not remove.  The catheter appeared clogged and I decided to remove it for TOV.\par  \par As far as erectile dysfunction, patient wants treatment for this as well but states that wants to make sure that he will not leak urine while having sex so wants to treat urinary incontinence first

## 2018-09-04 NOTE — PHYSICAL EXAM
[General Appearance - Well Developed] : well developed [General Appearance - Well Nourished] : well nourished [Normal Appearance] : normal appearance [Well Groomed] : well groomed [General Appearance - In No Acute Distress] : no acute distress [Abdomen Soft] : soft [Abdomen Tenderness] : non-tender [Costovertebral Angle Tenderness] : no ~M costovertebral angle tenderness [Urethral Meatus] : meatus normal [FreeTextEntry1] : clear urine in solomon bag [Skin Color & Pigmentation] : normal skin color and pigmentation [Edema] : no peripheral edema [] : no respiratory distress [Respiration, Rhythm And Depth] : normal respiratory rhythm and effort [Exaggerated Use Of Accessory Muscles For Inspiration] : no accessory muscle use [Oriented To Time, Place, And Person] : oriented to person, place, and time [Affect] : the affect was normal [Mood] : the mood was normal [Not Anxious] : not anxious [Normal Station and Gait] : the gait and station were normal for the patient's age [No Focal Deficits] : no focal deficits

## 2018-09-04 NOTE — LETTER BODY
[Dear  ___] : Dear  [unfilled], [Consult Letter:] : I had the pleasure of evaluating your patient, [unfilled]. [Please see my note below.] : Please see my note below. [Consult Closing:] : Thank you very much for allowing me to participate in the care of this patient.  If you have any questions, please do not hesitate to contact me. [Sincerely,] : Sincerely, [FreeTextEntry3] : Colten Castillo MD\par Director of Male Sexual Dysfunction and Urologic Prosthetics

## 2018-09-05 DIAGNOSIS — E66.9 OBESITY, UNSPECIFIED: ICD-10-CM

## 2018-09-05 DIAGNOSIS — N99.89 OTHER POSTPROCEDURAL COMPLICATIONS AND DISORDERS OF GENITOURINARY SYSTEM: ICD-10-CM

## 2018-09-05 DIAGNOSIS — Z96.0 PRESENCE OF UROGENITAL IMPLANTS: ICD-10-CM

## 2018-09-05 DIAGNOSIS — Z85.46 PERSONAL HISTORY OF MALIGNANT NEOPLASM OF PROSTATE: ICD-10-CM

## 2018-09-05 DIAGNOSIS — G47.33 OBSTRUCTIVE SLEEP APNEA (ADULT) (PEDIATRIC): ICD-10-CM

## 2018-09-05 DIAGNOSIS — R31.0 GROSS HEMATURIA: ICD-10-CM

## 2018-09-05 DIAGNOSIS — G43.909 MIGRAINE, UNSPECIFIED, NOT INTRACTABLE, WITHOUT STATUS MIGRAINOSUS: ICD-10-CM

## 2018-09-06 ENCOUNTER — APPOINTMENT (OUTPATIENT)
Dept: UROLOGY | Facility: CLINIC | Age: 68
End: 2018-09-06
Payer: MEDICARE

## 2018-09-06 VITALS
DIASTOLIC BLOOD PRESSURE: 61 MMHG | HEIGHT: 70 IN | SYSTOLIC BLOOD PRESSURE: 100 MMHG | HEART RATE: 69 BPM | WEIGHT: 216 LBS | BODY MASS INDEX: 30.92 KG/M2

## 2018-09-06 DIAGNOSIS — N32.0 BLADDER-NECK OBSTRUCTION: ICD-10-CM

## 2018-09-06 PROCEDURE — 51798 US URINE CAPACITY MEASURE: CPT

## 2018-09-06 PROCEDURE — 99024 POSTOP FOLLOW-UP VISIT: CPT

## 2018-09-06 NOTE — LETTER BODY
[Courtesy Letter:] : I had the pleasure of seeing your patient, [unfilled], in my office today. [Please see my note below.] : Please see my note below. [Consult Closing:] : Thank you very much for allowing me to participate in the care of this patient.  If you have any questions, please do not hesitate to contact me. [Sincerely,] : Sincerely, [FreeTextEntry2] : Juan Osborn DO\par [address] [facility]\[place] [address] \par \par

## 2018-09-06 NOTE — LETTER HEADER
[FreeTextEntry3] : Dr. Ben Thompson\par [address] \par [address] \[place] [address] \par Tel [phone] \par Fax [phone] \par

## 2018-09-06 NOTE — HISTORY OF PRESENT ILLNESS
[Currently Experiencing ___] :  [unfilled] [Urinary Incontinence] : urinary incontinence [Urinary Frequency] : urinary frequency [None] : None [FreeTextEntry1] : TYSHAWN BABCOCK is a 67 year old male with a past medical history of  radical prostatectomy done open in [place] February 2017. Since his operation he said total incontinence and no erections . Presents to the office today for a follow up, last seen by Dr. Castillo on 9/4/2018 for TOV. As per his incontinence, changes pads about 4-6 times per day. Patient states that seen a small clot, denies hematuria. Patient is here for bladder scan to ensure emptying well. PVR 8 ml done in office. \par  [Dysuria] : no dysuria [Hematuria - Gross] : no gross hematuria

## 2018-09-06 NOTE — ASSESSMENT
[FreeTextEntry1] : 67 year old with bladder neck contracture and urinary incontinence\par \par -PVR 8 ml done in office\par -F/U with Dr. Castillo in 6 weeks for cysto as scheduled. \par -UA and Urine culture ordered to be done before cysto\par -Cipro ordered to be taken day before cysto\par \par

## 2018-09-06 NOTE — END OF VISIT
[FreeTextEntry3] : I have seen and Evaluated the patient with NP Xin Ardon\par I agree with the content of her progress note and the plan of care outlined\par

## 2018-10-06 ENCOUNTER — OUTPATIENT (OUTPATIENT)
Dept: OUTPATIENT SERVICES | Facility: HOSPITAL | Age: 68
LOS: 1 days | Discharge: HOME | End: 2018-10-06

## 2018-10-06 ENCOUNTER — LABORATORY RESULT (OUTPATIENT)
Age: 68
End: 2018-10-06

## 2018-10-06 DIAGNOSIS — Z90.79 ACQUIRED ABSENCE OF OTHER GENITAL ORGAN(S): Chronic | ICD-10-CM

## 2018-10-06 DIAGNOSIS — N39.498 OTHER SPECIFIED URINARY INCONTINENCE: ICD-10-CM

## 2018-10-06 DIAGNOSIS — Z98.890 OTHER SPECIFIED POSTPROCEDURAL STATES: Chronic | ICD-10-CM

## 2018-10-08 LAB
APPEARANCE: CLEAR
BACTERIA UR CULT: NORMAL
BILIRUBIN URINE: NEGATIVE
BLOOD URINE: NEGATIVE
COLOR: YELLOW
GLUCOSE QUALITATIVE U: NEGATIVE MG/DL
KETONES URINE: NEGATIVE
LEUKOCYTE ESTERASE URINE: ABNORMAL
NITRITE URINE: NEGATIVE
PH URINE: 8
PROTEIN URINE: ABNORMAL MG/DL
SPECIFIC GRAVITY URINE: 1.02
UROBILINOGEN URINE: 0.2 MG/DL (ref 0.2–?)

## 2018-10-17 ENCOUNTER — LABORATORY RESULT (OUTPATIENT)
Age: 68
End: 2018-10-17

## 2018-10-17 ENCOUNTER — OUTPATIENT (OUTPATIENT)
Dept: OUTPATIENT SERVICES | Facility: HOSPITAL | Age: 68
LOS: 1 days | Discharge: HOME | End: 2018-10-17

## 2018-10-17 DIAGNOSIS — N39.498 OTHER SPECIFIED URINARY INCONTINENCE: ICD-10-CM

## 2018-10-17 DIAGNOSIS — Z98.890 OTHER SPECIFIED POSTPROCEDURAL STATES: Chronic | ICD-10-CM

## 2018-10-17 DIAGNOSIS — Z90.79 ACQUIRED ABSENCE OF OTHER GENITAL ORGAN(S): Chronic | ICD-10-CM

## 2018-10-18 ENCOUNTER — APPOINTMENT (OUTPATIENT)
Dept: UROLOGY | Facility: CLINIC | Age: 68
End: 2018-10-18

## 2018-10-18 LAB
APPEARANCE: ABNORMAL
BILIRUBIN URINE: NEGATIVE
BLOOD URINE: NEGATIVE
COLOR: YELLOW
GLUCOSE QUALITATIVE U: NEGATIVE
KETONES URINE: NEGATIVE
LEUKOCYTE ESTERASE URINE: ABNORMAL
NITRITE URINE: NEGATIVE
PH URINE: 6
PROTEIN URINE: NEGATIVE
SPECIFIC GRAVITY URINE: >=1.03
UROBILINOGEN URINE: 0.2 (ref 0.2–?)

## 2018-10-22 ENCOUNTER — APPOINTMENT (OUTPATIENT)
Dept: UROLOGY | Facility: CLINIC | Age: 68
End: 2018-10-22
Payer: MEDICARE

## 2018-10-22 LAB — BACTERIA UR CULT: NORMAL

## 2018-10-22 PROCEDURE — 52000 CYSTOURETHROSCOPY: CPT | Mod: 78

## 2018-10-22 PROCEDURE — 99213 OFFICE O/P EST LOW 20 MIN: CPT | Mod: 25

## 2018-10-22 RX ORDER — CIPROFLOXACIN HYDROCHLORIDE 500 MG/1
500 TABLET, FILM COATED ORAL
Qty: 6 | Refills: 0 | Status: DISCONTINUED | COMMUNITY
Start: 2018-09-06 | End: 2018-10-22

## 2018-10-22 NOTE — ASSESSMENT
[FreeTextEntry1] : Mr. Mcintosh is a very pleasant 68-year-old male who two years ago had a radical prostatectomy done open in [place] February 2017. Since his operation he said total incontinence. He also has no erections. He’s been tried on PDE five inhibitors and it didn’t work. He has not been tried on self injection with trimix, He has not wanted to try Cunningham clamp. Changes pads about 4-6 times per day. \par \par He in particular wants to fix his incontinence first as he states that he cannot have good sex until he is dry.\par \par He did have a bladder neck contracture on the first cysto -- since then, Dr pace performed an incision of bladder neck contracture.  complicated by gross hematuria requiring admission and CBI. \par cysto today -- see separate procedure note-- shows that there is no more bladder neck contracture\par  \par As far as erectile dysfunction, patient wants treatment for this as well but states that wants to make sure that he will not leak urine while having sex so wants to treat urinary incontinence first.

## 2018-10-22 NOTE — HISTORY OF PRESENT ILLNESS
[FreeTextEntry1] : Mr. Mcintosh is a very pleasant 68-year-old male who two years ago had a radical prostatectomy done open in [place] February 2017. Since his operation he said total incontinence. He also has no erections. He’s been tried on PDE five inhibitors and it didn’t work. He has not been tried on self injection with trimix, He has not wanted to try Cunningham clamp. Changes pads about 4-6 times per day. \par \par He in particular wants to fix his incontinence first as he states that he cannot have good sex until he is dry.\par \par He did have a bladder neck contracture on the first cysto -- since then, Dr pace performed an incision of bladder neck contracture.  complicated by gross hematuria requiring admission and CBI. \par cysto today -- see separate procedure note-- shows that there is no more bladder neck contracture\par  \par As far as erectile dysfunction, patient wants treatment for this as well but states that wants to make sure that he will not leak urine while having sex so wants to treat urinary incontinence first. \par

## 2018-10-22 NOTE — PHYSICAL EXAM
[General Appearance - Well Developed] : well developed [General Appearance - Well Nourished] : well nourished [Normal Appearance] : normal appearance [Well Groomed] : well groomed [General Appearance - In No Acute Distress] : no acute distress [Abdomen Soft] : soft [Abdomen Tenderness] : non-tender [Costovertebral Angle Tenderness] : no ~M costovertebral angle tenderness [Urethral Meatus] : meatus normal [Urinary Bladder Findings] : the bladder was normal on palpation [Skin Color & Pigmentation] : normal skin color and pigmentation [Edema] : no peripheral edema [] : no respiratory distress [Respiration, Rhythm And Depth] : normal respiratory rhythm and effort [Exaggerated Use Of Accessory Muscles For Inspiration] : no accessory muscle use [Oriented To Time, Place, And Person] : oriented to person, place, and time [Affect] : the affect was normal [Mood] : the mood was normal [Not Anxious] : not anxious [Normal Station and Gait] : the gait and station were normal for the patient's age [No Focal Deficits] : no focal deficits

## 2018-10-22 NOTE — LETTER BODY
[Dear  ___] : Dear  [unfilled], [Consult Letter:] : I had the pleasure of evaluating your patient, [unfilled]. [Please see my note below.] : Please see my note below. [Consult Closing:] : Thank you very much for allowing me to participate in the care of this patient.  If you have any questions, please do not hesitate to contact me. [Sincerely,] : Sincerely, [FreeTextEntry1] : Patient ready to proceed with artificial urinary sphincter.  once he is dry from incontinence, arnoldo proceed for planning for inflatable penile prosthesis. [FreeTextEntry3] : Colten Castillo MD\par Director of Male Sexual Dysfunction and Urologic Prosthetics

## 2018-11-06 ENCOUNTER — OUTPATIENT (OUTPATIENT)
Dept: OUTPATIENT SERVICES | Facility: HOSPITAL | Age: 68
LOS: 1 days | Discharge: HOME | End: 2018-11-06

## 2018-11-06 VITALS
DIASTOLIC BLOOD PRESSURE: 74 MMHG | OXYGEN SATURATION: 98 % | TEMPERATURE: 97 F | HEIGHT: 71 IN | RESPIRATION RATE: 16 BRPM | WEIGHT: 214.29 LBS | SYSTOLIC BLOOD PRESSURE: 123 MMHG | HEART RATE: 54 BPM

## 2018-11-06 DIAGNOSIS — Z98.890 OTHER SPECIFIED POSTPROCEDURAL STATES: Chronic | ICD-10-CM

## 2018-11-06 DIAGNOSIS — Z90.79 ACQUIRED ABSENCE OF OTHER GENITAL ORGAN(S): Chronic | ICD-10-CM

## 2018-11-06 DIAGNOSIS — N39.3 STRESS INCONTINENCE (FEMALE) (MALE): ICD-10-CM

## 2018-11-06 DIAGNOSIS — Z01.818 ENCOUNTER FOR OTHER PREPROCEDURAL EXAMINATION: ICD-10-CM

## 2018-11-06 LAB
ALBUMIN SERPL ELPH-MCNC: 4.2 G/DL — SIGNIFICANT CHANGE UP (ref 3.5–5.2)
ALP SERPL-CCNC: 54 U/L — SIGNIFICANT CHANGE UP (ref 30–115)
ALT FLD-CCNC: 11 U/L — SIGNIFICANT CHANGE UP (ref 0–41)
ANION GAP SERPL CALC-SCNC: 13 MMOL/L — SIGNIFICANT CHANGE UP (ref 7–14)
APPEARANCE UR: CLEAR — SIGNIFICANT CHANGE UP
APTT BLD: 33.4 SEC — SIGNIFICANT CHANGE UP (ref 27–39.2)
AST SERPL-CCNC: 17 U/L — SIGNIFICANT CHANGE UP (ref 0–41)
BILIRUB SERPL-MCNC: 0.3 MG/DL — SIGNIFICANT CHANGE UP (ref 0.2–1.2)
BILIRUB UR-MCNC: NEGATIVE — SIGNIFICANT CHANGE UP
BUN SERPL-MCNC: 18 MG/DL — SIGNIFICANT CHANGE UP (ref 10–20)
CALCIUM SERPL-MCNC: 9.2 MG/DL — SIGNIFICANT CHANGE UP (ref 8.5–10.1)
CHLORIDE SERPL-SCNC: 101 MMOL/L — SIGNIFICANT CHANGE UP (ref 98–110)
CO2 SERPL-SCNC: 26 MMOL/L — SIGNIFICANT CHANGE UP (ref 17–32)
COLOR SPEC: YELLOW — SIGNIFICANT CHANGE UP
CREAT SERPL-MCNC: 1.2 MG/DL — SIGNIFICANT CHANGE UP (ref 0.7–1.5)
DIFF PNL FLD: ABNORMAL
EPI CELLS # UR: ABNORMAL /HPF
GLUCOSE SERPL-MCNC: 83 MG/DL — SIGNIFICANT CHANGE UP (ref 70–99)
GLUCOSE UR QL: NEGATIVE MG/DL — SIGNIFICANT CHANGE UP
HCT VFR BLD CALC: 36.1 % — LOW (ref 42–52)
HGB BLD-MCNC: 12.5 G/DL — LOW (ref 14–18)
INR BLD: 1.07 RATIO — SIGNIFICANT CHANGE UP (ref 0.65–1.3)
KETONES UR-MCNC: NEGATIVE — SIGNIFICANT CHANGE UP
LEUKOCYTE ESTERASE UR-ACNC: ABNORMAL
MCHC RBC-ENTMCNC: 32 PG — HIGH (ref 27–31)
MCHC RBC-ENTMCNC: 34.6 G/DL — SIGNIFICANT CHANGE UP (ref 32–37)
MCV RBC AUTO: 92.3 FL — SIGNIFICANT CHANGE UP (ref 80–94)
NITRITE UR-MCNC: NEGATIVE — SIGNIFICANT CHANGE UP
NRBC # BLD: 0 /100 WBCS — SIGNIFICANT CHANGE UP (ref 0–0)
PH UR: 8 — SIGNIFICANT CHANGE UP (ref 5–8)
PLATELET # BLD AUTO: 174 K/UL — SIGNIFICANT CHANGE UP (ref 130–400)
POTASSIUM SERPL-MCNC: 4.1 MMOL/L — SIGNIFICANT CHANGE UP (ref 3.5–5)
POTASSIUM SERPL-SCNC: 4.1 MMOL/L — SIGNIFICANT CHANGE UP (ref 3.5–5)
PROT SERPL-MCNC: 6.7 G/DL — SIGNIFICANT CHANGE UP (ref 6–8)
PROT UR-MCNC: NEGATIVE MG/DL — SIGNIFICANT CHANGE UP
PROTHROM AB SERPL-ACNC: 12.3 SEC — SIGNIFICANT CHANGE UP (ref 9.95–12.87)
RBC # BLD: 3.91 M/UL — LOW (ref 4.7–6.1)
RBC # FLD: 13 % — SIGNIFICANT CHANGE UP (ref 11.5–14.5)
RBC CASTS # UR COMP ASSIST: SIGNIFICANT CHANGE UP /HPF
SODIUM SERPL-SCNC: 140 MMOL/L — SIGNIFICANT CHANGE UP (ref 135–146)
SP GR SPEC: 1.01 — SIGNIFICANT CHANGE UP (ref 1.01–1.03)
UROBILINOGEN FLD QL: 0.2 MG/DL — SIGNIFICANT CHANGE UP (ref 0.2–0.2)
WBC # BLD: 3.82 K/UL — LOW (ref 4.8–10.8)
WBC # FLD AUTO: 3.82 K/UL — LOW (ref 4.8–10.8)
WBC UR QL: SIGNIFICANT CHANGE UP /HPF

## 2018-11-06 NOTE — H&P PST ADULT - NSANTHOSAYNRD_GEN_A_CORE
see mari tool/No. MARI screening performed.  STOP BANG Legend: 0-2 = LOW Risk; 3-4 = INTERMEDIATE Risk; 5-8 = HIGH Risk

## 2018-11-06 NOTE — H&P PST ADULT - HISTORY OF PRESENT ILLNESS
68 year old male here for "implant to help my leakage"  fos=1   Denies chest pain +sob denies palp  denies uti or uti

## 2018-11-06 NOTE — H&P PST ADULT - PSH
H/O eye surgery  right eye cornea  H/O radical prostatectomy    History of prostate surgery  scar tissure removed 2018  History of prostate surgery  removed 2/14/17

## 2018-11-07 DIAGNOSIS — E66.9 OBESITY, UNSPECIFIED: ICD-10-CM

## 2018-11-07 DIAGNOSIS — R06.02 SHORTNESS OF BREATH: ICD-10-CM

## 2018-11-07 DIAGNOSIS — G47.30 SLEEP APNEA, UNSPECIFIED: ICD-10-CM

## 2018-11-07 DIAGNOSIS — C80.1 MALIGNANT (PRIMARY) NEOPLASM, UNSPECIFIED: ICD-10-CM

## 2018-11-08 LAB
CULTURE RESULTS: NO GROWTH — SIGNIFICANT CHANGE UP
SPECIMEN SOURCE: SIGNIFICANT CHANGE UP

## 2018-11-19 NOTE — ASU PATIENT PROFILE, ADULT - PSH
H/O colonoscopy  2018  H/O eye surgery  right eye cornea  H/O radical prostatectomy    History of prostate surgery  scar tissure removed 2018  History of prostate surgery  removed 2/14/17

## 2018-11-20 ENCOUNTER — OUTPATIENT (OUTPATIENT)
Dept: OUTPATIENT SERVICES | Facility: HOSPITAL | Age: 68
LOS: 1 days | Discharge: HOME | End: 2018-11-20

## 2018-11-20 ENCOUNTER — APPOINTMENT (OUTPATIENT)
Dept: UROLOGY | Facility: HOSPITAL | Age: 68
End: 2018-11-20
Payer: MEDICARE

## 2018-11-20 VITALS
OXYGEN SATURATION: 99 % | TEMPERATURE: 97 F | SYSTOLIC BLOOD PRESSURE: 144 MMHG | RESPIRATION RATE: 17 BRPM | HEIGHT: 71 IN | HEART RATE: 53 BPM | DIASTOLIC BLOOD PRESSURE: 84 MMHG | WEIGHT: 212.08 LBS

## 2018-11-20 VITALS — SYSTOLIC BLOOD PRESSURE: 138 MMHG | HEART RATE: 67 BPM | RESPIRATION RATE: 18 BRPM | DIASTOLIC BLOOD PRESSURE: 70 MMHG

## 2018-11-20 DIAGNOSIS — Z98.890 OTHER SPECIFIED POSTPROCEDURAL STATES: Chronic | ICD-10-CM

## 2018-11-20 DIAGNOSIS — Z90.79 ACQUIRED ABSENCE OF OTHER GENITAL ORGAN(S): Chronic | ICD-10-CM

## 2018-11-20 PROCEDURE — 53445 INSERT URO/VES NCK SPHINCTER: CPT | Mod: 58

## 2018-11-20 RX ORDER — DOCUSATE SODIUM 100 MG
1 CAPSULE ORAL
Qty: 90 | Refills: 0 | OUTPATIENT
Start: 2018-11-20 | End: 2018-12-19

## 2018-11-20 RX ORDER — LOSARTAN POTASSIUM 100 MG/1
1 TABLET, FILM COATED ORAL
Qty: 0 | Refills: 0 | COMMUNITY

## 2018-11-20 RX ORDER — VANCOMYCIN HCL 1 G
1000 VIAL (EA) INTRAVENOUS ONCE
Qty: 0 | Refills: 0 | Status: DISCONTINUED | OUTPATIENT
Start: 2018-11-20 | End: 2018-11-20

## 2018-11-20 RX ORDER — OXYCODONE AND ACETAMINOPHEN 5; 325 MG/1; MG/1
1 TABLET ORAL ONCE
Qty: 0 | Refills: 0 | Status: DISCONTINUED | OUTPATIENT
Start: 2018-11-20 | End: 2018-11-20

## 2018-11-20 RX ORDER — ACETAMINOPHEN 500 MG
650 TABLET ORAL ONCE
Qty: 0 | Refills: 0 | Status: DISCONTINUED | OUTPATIENT
Start: 2018-11-20 | End: 2018-12-05

## 2018-11-20 RX ORDER — ACETAMINOPHEN 500 MG
2 TABLET ORAL
Qty: 0 | Refills: 0 | COMMUNITY
Start: 2018-11-20

## 2018-11-20 RX ORDER — KETOROLAC TROMETHAMINE 30 MG/ML
1 SYRINGE (ML) INJECTION
Qty: 15 | Refills: 0 | OUTPATIENT
Start: 2018-11-20 | End: 2018-11-24

## 2018-11-20 RX ORDER — SODIUM CHLORIDE 9 MG/ML
1000 INJECTION, SOLUTION INTRAVENOUS
Qty: 0 | Refills: 0 | Status: DISCONTINUED | OUTPATIENT
Start: 2018-11-20 | End: 2018-12-05

## 2018-11-20 RX ORDER — L.ACIDOPH/B.ANIMALIS/B.LONGUM 15B CELL
1 CAPSULE ORAL
Qty: 0 | Refills: 0 | COMMUNITY

## 2018-11-20 RX ORDER — AZTREONAM 2 G
1 VIAL (EA) INJECTION
Qty: 14 | Refills: 0 | OUTPATIENT
Start: 2018-11-20 | End: 2018-11-26

## 2018-11-20 RX ORDER — HYDROMORPHONE HYDROCHLORIDE 2 MG/ML
0.5 INJECTION INTRAMUSCULAR; INTRAVENOUS; SUBCUTANEOUS
Qty: 0 | Refills: 0 | Status: DISCONTINUED | OUTPATIENT
Start: 2018-11-20 | End: 2018-11-20

## 2018-11-20 RX ORDER — ONDANSETRON 8 MG/1
4 TABLET, FILM COATED ORAL ONCE
Qty: 0 | Refills: 0 | Status: DISCONTINUED | OUTPATIENT
Start: 2018-11-20 | End: 2018-12-05

## 2018-11-20 RX ADMIN — SODIUM CHLORIDE 100 MILLILITER(S): 9 INJECTION, SOLUTION INTRAVENOUS at 16:42

## 2018-11-20 RX ADMIN — HYDROMORPHONE HYDROCHLORIDE 0.5 MILLIGRAM(S): 2 INJECTION INTRAMUSCULAR; INTRAVENOUS; SUBCUTANEOUS at 16:40

## 2018-11-20 RX ADMIN — HYDROMORPHONE HYDROCHLORIDE 0.5 MILLIGRAM(S): 2 INJECTION INTRAMUSCULAR; INTRAVENOUS; SUBCUTANEOUS at 16:15

## 2018-11-20 RX ADMIN — OXYCODONE AND ACETAMINOPHEN 1 TABLET(S): 5; 325 TABLET ORAL at 17:06

## 2018-11-20 NOTE — ASU DISCHARGE PLAN (ADULT/PEDIATRIC). - LAUNCH MEDICATION RECONCILIATION
Mail letter:    Your results are normal.  Melanie Patel MD   <<-----Click here for Discharge Medication Review

## 2018-11-20 NOTE — ASU DISCHARGE PLAN (ADULT/PEDIATRIC). - MEDICATION SUMMARY - MEDICATIONS TO TAKE
I will START or STAY ON the medications listed below when I get home from the hospital:    prunelay ciruelax  -- orally once a day  -- Indication: For N39.3/66955    stool softener  -- orally once a day  -- Indication: For N39.3/93591    natures bounty vitamin  -- once a day  -- Indication: For N39.3/47916    acetaminophen 325 mg oral tablet  -- 2 tab(s) by mouth once, As needed, Mild Pain (1 - 3)  -- Indication: For N39.3/59603    ketorolac 10 mg oral tablet  -- 1 tab(s) by mouth 3 times a day   -- It is very important that you take or use this exactly as directed.  Do not skip doses or discontinue unless directed by your doctor.  May cause drowsiness or dizziness.  Obtain medical advice before taking any non-prescription drugs as some may affect the action of this medication.  Take with food or milk.    -- Indication: For N39.3/87123    losartan 25 mg oral tablet  -- 1 tab(s) by mouth once a day  -- Indication: For N39.3/86776    Colace 100 mg oral capsule  -- 1 cap(s) by mouth 3 times a day   -- Medication should be taken with plenty of water.    -- Indication: For N39.3/91200    Probiotic Formula oral capsule  -- 1 cap(s) by mouth once a day  -- Indication: For N39.3/10277    SMZ-TMP  mg-160 mg oral tablet  -- 1 tab(s) by mouth 2 times a day (with meals)   -- Avoid prolonged or excessive exposure to direct and/or artificial sunlight while taking this medication.  Finish all this medication unless otherwise directed by prescriber.  Medication should be taken with plenty of water.    -- Indication: For N39.3/62495

## 2018-11-20 NOTE — BRIEF OPERATIVE NOTE - PROCEDURE
<<-----Click on this checkbox to enter Procedure Implantation of AMS artificial urinary sphincter  11/20/2018    Active  [id number]

## 2018-11-20 NOTE — PRE-ANESTHESIA EVALUATION ADULT - NSANTHOSAYNRD_GEN_A_CORE
No. LISA screening performed.  STOP BANG Legend: 0-2 = LOW Risk; 3-4 = INTERMEDIATE Risk; 5-8 = HIGH Risk/see lisa tool

## 2018-11-20 NOTE — CHART NOTE - NSCHARTNOTEFT_GEN_A_CORE
PACU ANESTHESIA ADMISSION NOTE      Procedure: Implantation of AMS artificial urinary sphincter    Post op diagnosis:  PINEDA (stress urinary incontinence), male      ____  Intubated  TV:______       Rate: ______      FiO2: ______    __x__  Patent Airway    __x__  Full return of protective reflexes    _x___  Full recovery from anesthesia / back to baseline status    Vitals:  T(C): 36.1 (11-20-18 @ 13:11), Max: 36.1 (11-20-18 @ 10:40)  HR: 53 (11-20-18 @ 13:11) (53 - 53)  BP: 144/84 (11-20-18 @ 13:11) (144/84 - 144/84)  RR: 17 (11-20-18 @ 13:11) (17 - 17)  SpO2: 99% (11-20-18 @ 13:11) (99% - 99%)    Mental Status:  ___x_ Awake   ____x_ Alert   _____ Drowsy   _____ Sedated    Nausea/Vomiting:  ___x_ NO  ______Yes,   See Post - Op Orders          Pain Scale (0-10):  _____    Treatment: __x__ None    ____ See Post - Op/PCA Orders    Post - Operative Fluids:   ____ Oral   x____ See Post - Op Orders    Plan: Discharge:   ___x_Home       _____Floor     _____Critical Care    _____  Other:_________________    Comments: uneventful anesthesia course no complications. VItals stable. Pt transferred to PACU

## 2018-11-20 NOTE — ASU DISCHARGE PLAN (ADULT/PEDIATRIC). - DRESSING FT
may remove scrotal support and fluff dressing in 48 hours. may remove abdominal dressing in 24 hours

## 2018-11-26 DIAGNOSIS — G47.30 SLEEP APNEA, UNSPECIFIED: ICD-10-CM

## 2018-11-26 DIAGNOSIS — N39.3 STRESS INCONTINENCE (FEMALE) (MALE): ICD-10-CM

## 2018-11-26 DIAGNOSIS — E66.9 OBESITY, UNSPECIFIED: ICD-10-CM

## 2018-12-01 NOTE — PRE-ANESTHESIA EVALUATION ADULT - WEIGHT IN KG
Reason For Visit  MAVIS MCCRACKEN is a(n) established patient seen today for follow up care. Patient seen today to re-assess back pain and follow up on watery stools.    Patient has hx of chronic lower back pain for which he had surgery November 2017. He reports some pain relieve from increased Tramadol dose from 50 mg to 100 mg qhs. He occasionally asks for Tramadol in the morning, but report more pain at night limiting his sleep. He also takes Gabapentin 100 mg TID. Patient sleeps in a recliner as that allows him to adjust a position to somewhat comfortable level. Hospital bed was ordered on 7/19/18 at admission and he is still waiting to be delivered.     His stool situation is the same, he reports watery stool several times a day. Denies fevers, abdominal cramping, steatorrhea or blood in the stool. When talking to Jaylyn, patient's daughter, she thinks it is better. Saturday when she was visiting, he has not had any episodes of watery stool and said to her that it improved. She is wandering how true his symptoms are and whether he gets confused with his past episodes. He has been switched to lactase free diet but Jaylyn says she still sees dairy on his plate. He takes lactase before meals.          Review of Systems  ROS Male:   Const: no fever and no chills.   Eyes: no eye pain, no red eyes and no itching of the eyes.   ENT: no earache, no discharge from the ears, no nasal passage blockage and no sore throat.   CV: no chest pain, no palpitations and no lightheadedness.   Resp: no cough and not expressed as feeling short of breath.   GI:. Per HPI.   : no dysuria.   Musc: back pain, but no joint pain, no joint swelling and no joint stiffness.   Neuro: no dizziness, no numbness and no feeling weak.   Skin: no bruising.       Allergies  morphine    Current Meds   1. Atorvastatin Calcium 20 MG Oral Tablet; TAKE 1 TABLET AT BEDTIME;   Therapy: (Recorded:[id number]) to Recorded   2. Fenofibrate 145 MG Oral Tablet; TAKE 1  TABLET DAILY WITH FOOD;   Therapy: (Recorded:82Vnj5475) to Recorded   3. Finasteride 5 MG Oral Tablet; TAKE 1 TABLET DAILY AS DIRECTED;   Therapy: (Recorded:42Tyt4455) to Recorded   4. HumaLOG Mix 75/25 (75-25) 100 UNIT/ML Subcutaneous Suspension; INJECT 20 UNIT   TWICE DAILY;   Therapy: (Recorded:97Qyz8299) to Recorded   5. Hydrocodone-Acetaminophen 5-325 MG Oral Tablet; take 1-2 tabs PO tid;   Therapy: 17Tfs0373 to (Last Rx:38Lmb1123) Ordered   6. Lactase TABS;   Therapy: (Recorded:87Vco0011) to Recorded   7. Metoprolol Tartrate 25 MG Oral Tablet; TAKE 12.5 TABLET DAILY;   Therapy: (Recorded:63Afw8990) to Recorded   8. Orphenadrine Citrate  MG Oral Tablet Extended Release 12 Hour;   Therapy: (Recorded:90Rof7481) to Recorded   9. Tamsulosin HCl - 0.4 MG Oral Capsule; TAKE 1 CAPSULE DAILY;   Therapy: (Recorded:36Bax8665) to Recorded   10. Terbinafine HCl - 250 MG Oral Tablet; TAKE 1 TABLET DAILY;    Therapy: 09Rxt0755 to (Evaluate:72Ifj8010)  Requested for: 60Cbi7032; Last    Rx:[id number] Ordered    Active Problems  Back pain (M54.9)  Benign essential hypertension (I10)  BPH (benign prostatic hyperplasia) (N40.0)  Gait instability (R26.81)  Hyperlipidemia, unspecified hyperlipidemia type (E78.5)  Hypertriglyceridemia (E78.1)  Lactose intolerance (E73.9)  Onychomycosis of multiple toenails with type 2 diabetes mellitus (E11.69,B35.1)  S/P placement of cardiac pacemaker (Z95.0)  Status post fall (Z91.81)  Tinea pedis of both feet (B35.3)  Type 2 diabetes mellitus (E11.9)  Watery diarrhea (R19.7)    Past Medical History  Benign essential hypertension (I10)  History of back pain (Z87.39)  History of benign prostatic hyperplasia (Z87.438)  History of paraplegia (Z86.69)  Hyperlipidemia, unspecified hyperlipidemia type (E78.5)  Lactose intolerance (E73.9)  Type 2 diabetes mellitus (E11.9)    Surgical History  History of appendectomy  History of back surgery    Family History  Mother   Family history of malignant  neoplasm of urinary bladder (Z80.52)  Sister   Family history of diabetes mellitus (Z83.3)  Brother   Family history of diabetes mellitus (Z83.3)  Family history of malignant neoplasm of brain (Z80.8)    Vitals  Signs   Recorded: 04Sep2018 05:57PM   Systolic: 130  Diastolic: 60  Temperature: 97.8 F  Heart Rate: 64  Respiration: 18    Physical Exam  Constitutional: alert, in no acute distress and current vital signs reviewed.   Head and Face: atraumatic.   Eyes: no discharge, normal conjunctiva and no eyelid swelling.   ENT: normal appearing outer ear, normal appearing nose. normal lips. oral mucosa pink and moist.   Neck: normal appearing neck.   Pulmonary: no respiratory distress, normal respiratory rate and effort and no accessory muscle use. breath sounds clear to auscultation bilaterally.   Cardiovascular: normal rate, no murmurs were heard, regular rhythm, normal S1 and normal S2. edema was not present in the lower extremities.   Abdomen: soft, nontender, nondistended, normal bowel sounds and no abdominal mass. no hepatomegaly.   Musculoskeletal: there was no joint instability noted. muscle strength and tone were normal. Tenderness to lower back on palpitation. Uses wheelchair to go to meals.   Neurologic: cranial nerves grossly intact.   Psychiatric: alert and awake, interactive and mood/affect were appropriate.   Skin, Hair, Nails: normal skin color and pigmentation and no rash.      Assessment  Back pain (M54.9)  Lactose intolerance (E73.9)  Watery diarrhea (R19.7)    Plan  Nursing Home Plan:   76 y/o male with PMH of HTN/HLD, DM Type II, BPH, lactose intolerance chronic back pain s/p surgery limiting sleep.   Gait instability, uses wheelchair to get to meals.  Takes tramadol 100 mg qhs, Tramadol 50 mg BID PRN.  Continue Gabapentin 100 mg TID and Orphenadrine q12hrs PRN.  Patient sleeps in a recliner due to lower back pain. Hospital bed re-ordered today.    Chronic diarrhea  C-diff, O/P negative. FIT test,  stool fat, lipase and LFT negative, WBC positive.   Celiac panel pending, awaiting results. If results negative, consider sending to GI for further evaluation.   Patient instructed to call nursing staff every time he has watery stool to verify.  Push fluids mot to get dehydrated.   Continue lactase before meals and lactose free diet.                       Plan discussed with patient, family and nurse.      Home Health  Face to Face Encounter:   Date of Face to Face Encounter: 9/4/18   First & Last Name of Physician/NP/PA Conducting the Visit: , NP.     Medical diagnosis for which face to face encounter was conducted and for which home health care services were ordered:   Chronic back pain s/p surgery.   Pain worse at night limits sleep.   Has been sleeping in a recliner.        Patient Encounter Findings     Subjective information:   See above.     Objective Information (Physical Exam Findings, Test Results, Progress/Lack of Progress, Functional Losses):   See above.     Homebound Status   Prior to this encounter, the patient was unable to safely leave home independently because of a medical condition.     The patient is now confined to the home because of the following medical conditions:   Arthritis and weakness limits endurance and increases the risk for falls outside the home environment   Unstable gait and muscle weakness due to back pain, hx of paraplegia.    Pain with activity which limits sleep.     Because of the conditions cited above, one or more of the following types of assistance to leave home is normally required:   Assistance of another person is required for the patient to safely leave the home.   Supportive Devices are Required to Safely Leave the Home: wheelchair.     Plan          Signatures   Electronically signed by : ASHWIN BUSH; Sep  4 2018  6:02PM CST     96.2

## 2018-12-05 ENCOUNTER — APPOINTMENT (OUTPATIENT)
Dept: UROLOGY | Facility: CLINIC | Age: 68
End: 2018-12-05
Payer: MEDICARE

## 2018-12-05 VITALS
BODY MASS INDEX: 30.64 KG/M2 | HEIGHT: 70 IN | DIASTOLIC BLOOD PRESSURE: 74 MMHG | HEART RATE: 81 BPM | SYSTOLIC BLOOD PRESSURE: 106 MMHG | WEIGHT: 214 LBS

## 2018-12-05 PROCEDURE — 99024 POSTOP FOLLOW-UP VISIT: CPT

## 2018-12-14 ENCOUNTER — OUTPATIENT (OUTPATIENT)
Dept: OUTPATIENT SERVICES | Facility: HOSPITAL | Age: 68
LOS: 1 days | Discharge: HOME | End: 2018-12-14

## 2018-12-14 ENCOUNTER — APPOINTMENT (OUTPATIENT)
Dept: UROLOGY | Facility: CLINIC | Age: 68
End: 2018-12-14
Payer: MEDICARE

## 2018-12-14 VITALS
WEIGHT: 214 LBS | DIASTOLIC BLOOD PRESSURE: 75 MMHG | HEART RATE: 55 BPM | SYSTOLIC BLOOD PRESSURE: 122 MMHG | HEIGHT: 70 IN | BODY MASS INDEX: 30.64 KG/M2

## 2018-12-14 DIAGNOSIS — Z98.890 OTHER SPECIFIED POSTPROCEDURAL STATES: Chronic | ICD-10-CM

## 2018-12-14 DIAGNOSIS — Z90.79 ACQUIRED ABSENCE OF OTHER GENITAL ORGAN(S): Chronic | ICD-10-CM

## 2018-12-14 DIAGNOSIS — Z44.9 ENCOUNTER FOR FITTING AND ADJUSTMENT OF UNSPECIFIED EXTERNAL PROSTHETIC DEVICE: ICD-10-CM

## 2018-12-14 PROCEDURE — 99024 POSTOP FOLLOW-UP VISIT: CPT

## 2018-12-14 PROCEDURE — 99213 OFFICE O/P EST LOW 20 MIN: CPT | Mod: 24

## 2018-12-14 RX ORDER — SULFAMETHOXAZOLE AND TRIMETHOPRIM 400; 80 MG/1; MG/1
400-80 TABLET ORAL
Qty: 2 | Refills: 0 | Status: DISCONTINUED | COMMUNITY
Start: 2018-10-22 | End: 2018-12-14

## 2018-12-15 DIAGNOSIS — N39.3 STRESS INCONTINENCE (FEMALE) (MALE): ICD-10-CM

## 2018-12-17 LAB
APPEARANCE: CLEAR
BACTERIA UR CULT: NORMAL
BILIRUBIN URINE: NEGATIVE
BLOOD URINE: NEGATIVE
COLOR: YELLOW
GLUCOSE QUALITATIVE U: NEGATIVE MG/DL
KETONES URINE: NEGATIVE
LEUKOCYTE ESTERASE URINE: NEGATIVE
NITRITE URINE: NEGATIVE
PH URINE: 7
PROTEIN URINE: NEGATIVE MG/DL
SPECIFIC GRAVITY URINE: <=1.005
UROBILINOGEN URINE: 0.2 MG/DL (ref 0.2–?)

## 2019-01-28 ENCOUNTER — APPOINTMENT (OUTPATIENT)
Dept: UROLOGY | Facility: CLINIC | Age: 69
End: 2019-01-28
Payer: MEDICARE

## 2019-01-28 VITALS
WEIGHT: 215 LBS | SYSTOLIC BLOOD PRESSURE: 140 MMHG | HEART RATE: 54 BPM | DIASTOLIC BLOOD PRESSURE: 87 MMHG | BODY MASS INDEX: 30.78 KG/M2 | HEIGHT: 70 IN

## 2019-01-28 PROCEDURE — 99024 POSTOP FOLLOW-UP VISIT: CPT

## 2019-01-28 PROCEDURE — 99213 OFFICE O/P EST LOW 20 MIN: CPT | Mod: 24

## 2019-01-28 NOTE — HISTORY OF PRESENT ILLNESS
[FreeTextEntry1] : 69 yo man\par 2 months post op after AUS\par doing well no signs of infection\par pain has resolved\par he was able to easily open the sphincter.\par appears to have some urge incontinence - unsure if has glaucoma\par \par He did have some residual leakage after the activation. possibly due to the bladder not being used to being distended. we will watch and possibly start anticholinergics \par \par Also erectile dysfunction\par now that he will be dry, interested in IPP vs injection therapy\par

## 2019-01-28 NOTE — PHYSICAL EXAM
[General Appearance - Well Developed] : well developed [General Appearance - Well Nourished] : well nourished [Normal Appearance] : normal appearance [Well Groomed] : well groomed [General Appearance - In No Acute Distress] : no acute distress [Abdomen Soft] : soft [Abdomen Tenderness] : non-tender [Costovertebral Angle Tenderness] : no ~M costovertebral angle tenderness [Urethral Meatus] : meatus normal [Urinary Bladder Findings] : the bladder was normal on palpation [Skin Color & Pigmentation] : normal skin color and pigmentation [Edema] : no peripheral edema [] : no respiratory distress [Respiration, Rhythm And Depth] : normal respiratory rhythm and effort [Exaggerated Use Of Accessory Muscles For Inspiration] : no accessory muscle use [Oriented To Time, Place, And Person] : oriented to person, place, and time [Affect] : the affect was normal [Mood] : the mood was normal [Not Anxious] : not anxious [Normal Station and Gait] : the gait and station were normal for the patient's age [No Focal Deficits] : no focal deficits [FreeTextEntry1] : pump within the scrotum in good position

## 2019-02-06 ENCOUNTER — OUTPATIENT (OUTPATIENT)
Dept: OUTPATIENT SERVICES | Facility: HOSPITAL | Age: 69
LOS: 1 days | Discharge: HOME | End: 2019-02-06

## 2019-02-06 ENCOUNTER — RESULT REVIEW (OUTPATIENT)
Age: 69
End: 2019-02-06

## 2019-02-06 DIAGNOSIS — C61 MALIGNANT NEOPLASM OF PROSTATE: ICD-10-CM

## 2019-02-06 DIAGNOSIS — M17.0 BILATERAL PRIMARY OSTEOARTHRITIS OF KNEE: ICD-10-CM

## 2019-02-06 DIAGNOSIS — Z98.890 OTHER SPECIFIED POSTPROCEDURAL STATES: Chronic | ICD-10-CM

## 2019-02-06 DIAGNOSIS — G47.33 OBSTRUCTIVE SLEEP APNEA (ADULT) (PEDIATRIC): ICD-10-CM

## 2019-02-06 DIAGNOSIS — Z90.79 ACQUIRED ABSENCE OF OTHER GENITAL ORGAN(S): Chronic | ICD-10-CM

## 2019-02-06 DIAGNOSIS — M54.12 RADICULOPATHY, CERVICAL REGION: ICD-10-CM

## 2019-02-06 DIAGNOSIS — I10 ESSENTIAL (PRIMARY) HYPERTENSION: ICD-10-CM

## 2019-04-01 ENCOUNTER — APPOINTMENT (OUTPATIENT)
Dept: UROLOGY | Facility: CLINIC | Age: 69
End: 2019-04-01
Payer: COMMERCIAL

## 2019-04-01 VITALS
WEIGHT: 215 LBS | SYSTOLIC BLOOD PRESSURE: 138 MMHG | BODY MASS INDEX: 30.78 KG/M2 | DIASTOLIC BLOOD PRESSURE: 88 MMHG | HEART RATE: 50 BPM | HEIGHT: 70 IN

## 2019-04-01 PROCEDURE — 99213 OFFICE O/P EST LOW 20 MIN: CPT | Mod: 25

## 2019-04-01 PROCEDURE — 54235 NJX CORPORA CAVERNOSA RX AGT: CPT

## 2019-04-01 RX ORDER — OXYBUTYNIN CHLORIDE 5 MG/1
5 TABLET, EXTENDED RELEASE ORAL
Qty: 30 | Refills: 2 | Status: ACTIVE | COMMUNITY
Start: 2019-04-01 | End: 1900-01-01

## 2019-04-01 NOTE — ASSESSMENT
[FreeTextEntry1] : 69 yo man\par 4 months post op after AUS\par doing well no signs of infection\par pain has resolved\par he was able to easily open the sphincter.\par appears to have some urge incontinence - unsure if has glaucoma.  has decrease number of pads from 6 to 4\par \par He did have some residual leakage after the activation. possibly due to the bladder not being used to being distended and urge incontinence -- brings note from Dr Thomas which states can use anticholinergics\par \par Also erectile dysfunction\par now that he will be dry, interested in IPP vs injection therapy --- injection today showed 40% erection with 0.2ml of #5 trimix \par \par PSA 0.25 one year ago -- Feb 2019 0.28

## 2019-04-15 NOTE — ASU PREOP CHECKLIST - BMI (KG/M2)
AAOX3, NAD, NC,AT  CVS: S1S2 RRR  RESP: bilateral wheezing  ABD: Soft NT, ND   EXT: Motor sensory grossly intact. 29.6

## 2019-05-15 ENCOUNTER — APPOINTMENT (OUTPATIENT)
Dept: UROLOGY | Facility: CLINIC | Age: 69
End: 2019-05-15

## 2019-08-05 ENCOUNTER — RX RENEWAL (OUTPATIENT)
Age: 69
End: 2019-08-05

## 2019-09-20 ENCOUNTER — APPOINTMENT (OUTPATIENT)
Dept: UROLOGY | Facility: CLINIC | Age: 69
End: 2019-09-20
Payer: MEDICARE

## 2019-09-20 PROCEDURE — 99213 OFFICE O/P EST LOW 20 MIN: CPT

## 2019-09-20 NOTE — HISTORY OF PRESENT ILLNESS
[FreeTextEntry1] : 69 yo man history of radical prostatectomy one year ago\par 9 months post op after AUS\par doing well no signs of infection\par pain has resolved\par he was able to easily open the sphincter.\par appears to have some urge incontinence - no glaucoma. has decrease number of pads from 6 to 1 or 2\par \par appears to have right hernia with discomfort at the right inguinal region when he coughs\par \par He did have some residual leakage after the activation. possibly due to the bladder not being used to being distended and urge incontinence -- brings note from Dr Thomas which states can use anticholinergics\par \par Also erectile dysfunction\par now that he will be dry, interested in IPP vs injection therapy --- injection last time showed 40% erection with 0.2ml of #5 trimix has only gone up to 0.3ml which was not as effective as he wanted \par \par PSA 0.25 one year ago -- Feb 2019 0.28. \par \par \par

## 2019-09-20 NOTE — ASSESSMENT
[FreeTextEntry1] : 69 yo man history of radical prostatectomy one year ago\par 9 months post op after AUS\par doing well no signs of infection\par pain has resolved\par he was able to easily open the sphincter.\par appears to have some urge incontinence - no glaucoma. has decrease number of pads from 6 to 1 or 2\par \par appears to have right hernia with discomfort at the right inguinal region when he coughs\par \par He did have some residual leakage after the activation. possibly due to the bladder not being used to being distended and urge incontinence -- brings note from Dr Thomas which states can use anticholinergics\par \par Also erectile dysfunction\par now that he will be dry, interested in IPP vs injection therapy --- injection last time showed 40% erection with 0.2ml of #5 trimix has only gone up to 0.3ml which was not as effective as he wanted \par \par PSA 0.25 one year ago -- Feb 2019 0.28.

## 2019-09-20 NOTE — PHYSICAL EXAM
[General Appearance - Well Nourished] : well nourished [General Appearance - Well Developed] : well developed [Well Groomed] : well groomed [Normal Appearance] : normal appearance [General Appearance - In No Acute Distress] : no acute distress [Abdomen Soft] : soft [Costovertebral Angle Tenderness] : no ~M costovertebral angle tenderness [Abdomen Tenderness] : non-tender [Urethral Meatus] : meatus normal [Urinary Bladder Findings] : the bladder was normal on palpation [FreeTextEntry1] : pump within the scrotum in good position [Skin Color & Pigmentation] : normal skin color and pigmentation [] : no respiratory distress [Respiration, Rhythm And Depth] : normal respiratory rhythm and effort [Edema] : no peripheral edema [Oriented To Time, Place, And Person] : oriented to person, place, and time [Exaggerated Use Of Accessory Muscles For Inspiration] : no accessory muscle use [Not Anxious] : not anxious [Mood] : the mood was normal [Affect] : the affect was normal [Normal Station and Gait] : the gait and station were normal for the patient's age [No Focal Deficits] : no focal deficits

## 2019-10-02 ENCOUNTER — APPOINTMENT (OUTPATIENT)
Dept: SURGERY | Facility: CLINIC | Age: 69
End: 2019-10-02
Payer: MEDICARE

## 2019-10-02 VITALS
BODY MASS INDEX: 29.35 KG/M2 | HEIGHT: 70 IN | SYSTOLIC BLOOD PRESSURE: 120 MMHG | WEIGHT: 205 LBS | DIASTOLIC BLOOD PRESSURE: 80 MMHG

## 2019-10-02 DIAGNOSIS — K40.90 UNILATERAL INGUINAL HERNIA, W/OUT OBSTRUCTION OR GANGRENE, NOT SPECIFIED AS RECURRENT: ICD-10-CM

## 2019-10-02 PROCEDURE — 99203 OFFICE O/P NEW LOW 30 MIN: CPT

## 2019-10-09 NOTE — CONSULT LETTER
[Please see my note below.] : Please see my note below. [Sincerely,] : Sincerely, [Dear  ___] : Dear  [unfilled], [FreeTextEntry3] : Gayathri Corbin MD\par Bariatric & Minimally Invasive Surgery\par [facility]\par [phone]\par

## 2019-10-09 NOTE — ASSESSMENT
[FreeTextEntry1] : 67yo male with PMHx of hypertension, s/p open prostatectomy for prostate cancer, urinary incontinence, occasional constipation evaluated for right inguinal hernia.\par -constipation - recommend better bowel regimen - water, miralax, high fiber, avoid straining \par -recent colonoscopy 2018\par -risks, benefits, and alteratives were explained to the patient - including risk of bleeding, pain, infection, mesh complications, recurrence\par -explain to the patient that, without surgery, the patient may experience acute bowel incarceration and require emergency repair\par -discussed difference between laparoscopic and open inguinal hernia repair, explaining that because of the previous prostatectomy, laparoscopic surgery will be more challenging; I recommend OPEN RIGHT INGUINAL HERNIA REPAIR WITH MESH at this time\par -all questions were answered\par -will require PAT's\par -patient will decide at the end of the week after conferring with his wife, but he says that he needs to have the surgery done because he experiences a lot of discomfort

## 2019-10-09 NOTE — PHYSICAL EXAM
[Normal Breath Sounds] : Normal breath sounds [Normal Heart Sounds] : normal heart sounds [Alert] : alert [Calm] : calm [de-identified] : decreased movement OD [de-identified] : no acute distress [de-identified] : soft, lower midline scar, right inguinal hernia reducible, mild tenderness, no hernia left side [de-identified] : full Rom x 4

## 2019-10-09 NOTE — HISTORY OF PRESENT ILLNESS
[de-identified] : 69yo male with PMHx of hypertension, prostate cancer s/p prostatectomy, urinary incontinence, and occasional constipation presents for evaluation of right groin pain. Patient reports that has had intermittent, sharp pain in the right groin for 1 month, as well as swelling in the area. No prior episodes, never had hernia anywhere else. Recent colonoscopy 2018 - benign polyps removed.

## 2019-11-25 ENCOUNTER — APPOINTMENT (OUTPATIENT)
Dept: UROLOGY | Facility: CLINIC | Age: 69
End: 2019-11-25
Payer: MEDICARE

## 2019-11-25 DIAGNOSIS — N39.41 URGE INCONTINENCE: ICD-10-CM

## 2019-11-25 PROCEDURE — 99213 OFFICE O/P EST LOW 20 MIN: CPT

## 2019-11-25 RX ORDER — OXYBUTYNIN CHLORIDE 5 MG/1
5 TABLET ORAL DAILY
Qty: 90 | Refills: 3 | Status: ACTIVE | COMMUNITY
Start: 2019-08-05 | End: 1900-01-01

## 2019-11-25 NOTE — PHYSICAL EXAM
[General Appearance - Well Nourished] : well nourished [General Appearance - Well Developed] : well developed [Well Groomed] : well groomed [General Appearance - In No Acute Distress] : no acute distress [Normal Appearance] : normal appearance [Abdomen Soft] : soft [Abdomen Tenderness] : non-tender [Costovertebral Angle Tenderness] : no ~M costovertebral angle tenderness [Urethral Meatus] : meatus normal [Urinary Bladder Findings] : the bladder was normal on palpation [FreeTextEntry1] : pump within the scrotum in good position [Skin Color & Pigmentation] : normal skin color and pigmentation [Edema] : no peripheral edema [Exaggerated Use Of Accessory Muscles For Inspiration] : no accessory muscle use [Respiration, Rhythm And Depth] : normal respiratory rhythm and effort [] : no respiratory distress [Mood] : the mood was normal [Oriented To Time, Place, And Person] : oriented to person, place, and time [Affect] : the affect was normal [No Focal Deficits] : no focal deficits [Not Anxious] : not anxious [Normal Station and Gait] : the gait and station were normal for the patient's age

## 2019-11-25 NOTE — ASSESSMENT
[FreeTextEntry1] : 67 yo man history of radical prostatectomy over one year ago\par 12 months post op after AUS\par doing well no signs of infection\par pain has resolved\par he was able to easily open the sphincter.\par appears to have some urge incontinence - no glaucoma. has decrease number of pads from 6 to 1 or 2\par \par appears to have right hernia with discomfort at the right inguinal region when he coughs\par \par He did have some residual leakage after the activation. ditropan helped a bit \par \par Also erectile dysfunction\par now that he will be dry, interested in IPP vs injection therapy --- injection last time showed 40% erection with 0.2ml of #5 trimix has only gone up to 0.3ml which was not as effective as he wanted \par \par PSA 0.25 one year ago -- Feb 2019 0.28. \par Med documents from outside reviewed: PSA Oct 2019= 0.27

## 2020-05-19 ENCOUNTER — APPOINTMENT (OUTPATIENT)
Dept: UROLOGY | Facility: CLINIC | Age: 70
End: 2020-05-19

## 2021-09-22 ENCOUNTER — APPOINTMENT (OUTPATIENT)
Dept: UROLOGY | Facility: CLINIC | Age: 71
End: 2021-09-22
Payer: SELF-PAY

## 2021-09-22 VITALS — HEIGHT: 70 IN | WEIGHT: 198 LBS | BODY MASS INDEX: 28.35 KG/M2

## 2021-09-22 DIAGNOSIS — C61 MALIGNANT NEOPLASM OF PROSTATE: ICD-10-CM

## 2021-09-22 DIAGNOSIS — N52.9 MALE ERECTILE DYSFUNCTION, UNSPECIFIED: ICD-10-CM

## 2021-09-22 DIAGNOSIS — N39.3 STRESS INCONTINENCE (FEMALE) (MALE): ICD-10-CM

## 2021-09-22 PROCEDURE — 99213 OFFICE O/P EST LOW 20 MIN: CPT

## 2021-09-22 NOTE — HISTORY OF PRESENT ILLNESS
[FreeTextEntry1] : 69 yo man history of radical prostatectomy over 3 year ago\par  3 months post op after AUS\par doing well no signs of infection\par pain has resolved\par he was able to easily open the sphincter.\par appears to have some urge incontinence - no glaucoma. has decrease number of pads from 6 to 1 or 2\par \par appears to have right hernia with discomfort at the right inguinal region when he coughs\par \par He did have some residual leakage after the activation. ditropan helped a bit \par \par Also erectile dysfunction\par now that he will be dry, interested in IPP vs injection therapy --- injection last time showed 40% erection with 0.2ml of #5 trimix has only gone up to 0.3ml which was not as effective as he wanted \par \par states get easily winded so we have avoided further ED  treatment\par \par PSA 0.25 one year ago -- Feb 2019 0.28. \par Med documents from outside reviewed: PSA Oct 2019= 0.27. \par \par

## 2021-09-22 NOTE — ASSESSMENT
[FreeTextEntry1] : 71 yo man history of radical prostatectomy over 3 year ago\par  3 months post op after AUS\par doing well no signs of infection\par pain has resolved\par he was able to easily open the sphincter.\par appears to have some urge incontinence - no glaucoma. has decrease number of pads from 6 to 1 or 2\par \par appears to have right hernia with discomfort at the right inguinal region when he coughs\par \par He did have some residual leakage after the activation. ditropan helped a bit \par \par Also erectile dysfunction\par now that he will be dry, interested in IPP vs injection therapy --- injection last time showed 40% erection with 0.2ml of #5 trimix has only gone up to 0.3ml which was not as effective as he wanted \par \par states get easily winded so we have avoided further ED  treatment\par \par PSA 0.25 one year ago -- Feb 2019 0.28. \par Med documents from outside reviewed: PSA Oct 2019= 0.27. \par \par

## 2021-10-05 LAB — PSA, POST - PROSTATECTOMY: 0.24 NG/ML

## 2022-01-05 ENCOUNTER — APPOINTMENT (OUTPATIENT)
Dept: UROLOGY | Facility: CLINIC | Age: 72
End: 2022-01-05

## 2022-05-13 NOTE — ASU PREOP CHECKLIST - WEIGHT IN LBS
[facility] TCN chart review completed. Collaborated with Vicky FERNANDES. Pt has been dc'd to home with [facility] accepting at time of writing this note. Sent update to [facility] as well as [facility] with updates. See prior TCN note from 5/12 with re: dc plan. TCN will continue to follow and collaborate with discharge planning team as additional post acute needs arise. Thank you.    Previously completed:  - Choice forms:HH.   - [facility] introduced (Y), referral (sent).    214

## 2023-03-23 NOTE — ASU PATIENT PROFILE, ADULT - SURGICAL SITE INCISION
Patient to intake. Safety checks initiated.  pockets emptied and search completed with two staff members present. Items logged and placed in cabinet in intake area. Pt placed in safe room for evaluation. Will continue to monitor pt status. Waiting for ED provider to clear pt medically. Patient educated on the need to wear mask at all times while in the intake area for their safety and the safety of others.    no

## 2024-09-25 NOTE — ASU PREOP CHECKLIST - VERIFY SURGICAL SITE/SIDE WITH PATIENT
done [facility]  used but patient speaks a dialect called tayla - ,  norman [id number]. Pt c/o b/L hand pain denies any trauma for 2-3 weeks. Also endorsing left  ear pain x3 days. Denies fevers, chills, chest pain, coughing, headache.